# Patient Record
Sex: FEMALE | Race: WHITE | Employment: UNEMPLOYED | URBAN - METROPOLITAN AREA
[De-identification: names, ages, dates, MRNs, and addresses within clinical notes are randomized per-mention and may not be internally consistent; named-entity substitution may affect disease eponyms.]

---

## 2020-06-12 ENCOUNTER — HOSPITAL ENCOUNTER (EMERGENCY)
Age: 30
Discharge: HOME | End: 2020-06-12
Attending: EMERGENCY MEDICINE
Payer: COMMERCIAL

## 2020-06-12 VITALS
TEMPERATURE: 98.5 F | OXYGEN SATURATION: 98 % | WEIGHT: 160 LBS | DIASTOLIC BLOOD PRESSURE: 75 MMHG | HEART RATE: 95 BPM | RESPIRATION RATE: 18 BRPM | SYSTOLIC BLOOD PRESSURE: 130 MMHG | BODY MASS INDEX: 27.46 KG/M2

## 2020-06-12 DIAGNOSIS — R10.9 ABDOMINAL CRAMPS: Primary | ICD-10-CM

## 2020-06-12 LAB
APPEARANCE UR: CLEAR
BILIRUB UR QL: NEGATIVE
COLOR UR: YELLOW
GLUCOSE UR STRIP.AUTO-MCNC: NEGATIVE MG/DL
HCG UR QL: NEGATIVE
HGB UR QL STRIP: NEGATIVE
KETONES UR QL STRIP.AUTO: NEGATIVE MG/DL
LEUKOCYTE ESTERASE UR QL STRIP.AUTO: NEGATIVE
NITRITE UR QL STRIP.AUTO: NEGATIVE
PH UR STRIP: 6 [PH] (ref 5–8)
PROT UR STRIP-MCNC: NEGATIVE MG/DL
SP GR UR REFRACTOMETRY: <1.005 (ref 1–1.03)
UROBILINOGEN UR QL STRIP.AUTO: 1 EU/DL (ref 0.2–1)

## 2020-06-12 PROCEDURE — 81025 URINE PREGNANCY TEST: CPT

## 2020-06-12 PROCEDURE — 81003 URINALYSIS AUTO W/O SCOPE: CPT

## 2020-06-12 PROCEDURE — 99283 EMERGENCY DEPT VISIT LOW MDM: CPT

## 2020-06-12 NOTE — DISCHARGE INSTRUCTIONS

## 2020-06-12 NOTE — ED PROVIDER NOTES
HPI patient says she is here for a pregnancy test.  She has a history of diffuse multiple abdominal type complaints to include abdominal cramping abdominal burning and nausea \"for several years\". On initial interview she has diffuse multiple nonspecific complaints. She has no consistent specific complaint. When I asked her Bebe Worthington can I do for you today\" her response was \"I really just want a pregnancy test\". She says she is about a week late on her menstrual cycle and has not taken a home pregnancy test.    Past Medical History:   Diagnosis Date    Heart murmur     Recurrent UTI        Past Surgical History:   Procedure Laterality Date    HX WISDOM TEETH EXTRACTION           No family history on file.     Social History     Socioeconomic History    Marital status: SINGLE     Spouse name: Not on file    Number of children: Not on file    Years of education: Not on file    Highest education level: Not on file   Occupational History    Not on file   Social Needs    Financial resource strain: Not on file    Food insecurity     Worry: Not on file     Inability: Not on file    Transportation needs     Medical: Not on file     Non-medical: Not on file   Tobacco Use    Smoking status: Current Every Day Smoker     Packs/day: 1.00   Substance and Sexual Activity    Alcohol use: Yes     Comment: social    Drug use: Not on file    Sexual activity: Not on file   Lifestyle    Physical activity     Days per week: Not on file     Minutes per session: Not on file    Stress: Not on file   Relationships    Social connections     Talks on phone: Not on file     Gets together: Not on file     Attends Judaism service: Not on file     Active member of club or organization: Not on file     Attends meetings of clubs or organizations: Not on file     Relationship status: Not on file    Intimate partner violence     Fear of current or ex partner: Not on file     Emotionally abused: Not on file     Physically abused: Not on file     Forced sexual activity: Not on file   Other Topics Concern    Not on file   Social History Narrative    Not on file         ALLERGIES: Haldol [haloperidol lactate]    Review of Systems   Constitutional: Negative. HENT: Negative. Eyes: Negative. Respiratory: Negative. Cardiovascular: Negative. Gastrointestinal: Positive for abdominal pain and nausea. Genitourinary: Negative. Neurological: Negative. Psychiatric/Behavioral: Positive for dysphoric mood. The patient is nervous/anxious. Vitals:    06/12/20 1304   BP: 130/75   Pulse: 95   Resp: 18   Temp: 98.5 °F (36.9 °C)   SpO2: 98%   Weight: 72.6 kg (160 lb)            Physical Exam  Vitals signs and nursing note reviewed. Constitutional:       Appearance: She is well-developed. HENT:      Head: Normocephalic and atraumatic. Eyes:      Conjunctiva/sclera: Conjunctivae normal.      Pupils: Pupils are equal, round, and reactive to light. Neck:      Musculoskeletal: Normal range of motion and neck supple. Cardiovascular:      Rate and Rhythm: Normal rate and regular rhythm. Pulmonary:      Effort: Pulmonary effort is normal.      Breath sounds: Normal breath sounds. Abdominal:      Palpations: Abdomen is soft. Musculoskeletal: Normal range of motion. Skin:     General: Skin is warm and dry. Neurological:      Mental Status: She is alert and oriented to person, place, and time. MDM         Procedures               I reviewed the results of the ER evaluation with the patient and she understands and agrees with the disposition and follow-up plan.   Gay El MD 2:17 PM

## 2020-07-29 ENCOUNTER — HOSPITAL ENCOUNTER (EMERGENCY)
Age: 30
Discharge: HOME | End: 2020-07-30
Attending: EMERGENCY MEDICINE | Admitting: EMERGENCY MEDICINE
Payer: COMMERCIAL

## 2020-07-29 VITALS
OXYGEN SATURATION: 96 % | TEMPERATURE: 98.7 F | SYSTOLIC BLOOD PRESSURE: 116 MMHG | RESPIRATION RATE: 18 BRPM | HEART RATE: 100 BPM | DIASTOLIC BLOOD PRESSURE: 76 MMHG

## 2020-07-29 DIAGNOSIS — F41.9 ANXIETY: ICD-10-CM
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
## 2020-07-29 LAB
ALBUMIN SERPL-MCNC: 4.7 G/DL (ref 3.4–5)
ALBUMIN/GLOB SERPL: 1.3 {RATIO} (ref 0.8–1.7)
ALP SERPL-CCNC: 83 U/L (ref 45–117)
ALT SERPL-CCNC: 22 U/L (ref 13–56)
AMPHET UR QL SCN: NEGATIVE
ANION GAP SERPL CALC-SCNC: 7 MMOL/L (ref 3–18)
AST SERPL-CCNC: 14 U/L (ref 10–38)
BARBITURATES UR QL SCN: NEGATIVE
BASOPHILS # BLD: 0 K/UL (ref 0–0.1)
BASOPHILS NFR BLD: 0 % (ref 0–2)
BENZODIAZ UR QL: NEGATIVE
BILIRUB SERPL-MCNC: 0.2 MG/DL (ref 0.2–1)
BUN SERPL-MCNC: 11 MG/DL (ref 7–18)
BUN/CREAT SERPL: 13 (ref 12–20)
CALCIUM SERPL-MCNC: 9.6 MG/DL (ref 8.5–10.1)
CANNABINOIDS UR QL SCN: NEGATIVE
CHLORIDE SERPL-SCNC: 109 MMOL/L (ref 100–111)
CO2 SERPL-SCNC: 25 MMOL/L (ref 21–32)
COCAINE UR QL SCN: NEGATIVE
COVID-19 RAPID TEST, COVR: NOT DETECTED
CREAT SERPL-MCNC: 0.84 MG/DL (ref 0.6–1.3)
DIFFERENTIAL METHOD BLD: ABNORMAL
EOSINOPHIL # BLD: 0.1 K/UL (ref 0–0.4)
EOSINOPHIL NFR BLD: 1 % (ref 0–5)
ERYTHROCYTE [DISTWIDTH] IN BLOOD BY AUTOMATED COUNT: 13.8 % (ref 11.6–14.5)
ETHANOL SERPL-MCNC: <3 MG/DL (ref 0–3)
GLOBULIN SER CALC-MCNC: 3.6 G/DL (ref 2–4)
GLUCOSE SERPL-MCNC: 101 MG/DL (ref 74–99)
HCG UR QL: NEGATIVE
HCT VFR BLD AUTO: 41.5 % (ref 35–45)
HDSCOM,HDSCOM: NORMAL
HGB BLD-MCNC: 13.9 G/DL (ref 12–16)
LYMPHOCYTES # BLD: 2.3 K/UL (ref 0.9–3.6)
LYMPHOCYTES NFR BLD: 20 % (ref 21–52)
MCH RBC QN AUTO: 28.5 PG (ref 24–34)
MCHC RBC AUTO-ENTMCNC: 33.5 G/DL (ref 31–37)
MCV RBC AUTO: 85 FL (ref 74–97)
METHADONE UR QL: NEGATIVE
MONOCYTES # BLD: 0.7 K/UL (ref 0.05–1.2)
MONOCYTES NFR BLD: 6 % (ref 3–10)
NEUTS SEG # BLD: 8.3 K/UL (ref 1.8–8)
NEUTS SEG NFR BLD: 73 % (ref 40–73)
OPIATES UR QL: NEGATIVE
PCP UR QL: NEGATIVE
PLATELET # BLD AUTO: 409 K/UL (ref 135–420)
PMV BLD AUTO: 9.5 FL (ref 9.2–11.8)
POTASSIUM SERPL-SCNC: 3.9 MMOL/L (ref 3.5–5.5)
PROT SERPL-MCNC: 8.3 G/DL (ref 6.4–8.2)
RBC # BLD AUTO: 4.88 M/UL (ref 4.2–5.3)
SODIUM SERPL-SCNC: 141 MMOL/L (ref 136–145)
SOURCE, COVRS: NORMAL
WBC # BLD AUTO: 11.4 K/UL (ref 4.6–13.2)

## 2020-07-29 PROCEDURE — 85025 COMPLETE CBC W/AUTO DIFF WBC: CPT

## 2020-07-29 PROCEDURE — 87635 SARS-COV-2 COVID-19 AMP PRB: CPT

## 2020-07-29 PROCEDURE — 80053 COMPREHEN METABOLIC PANEL: CPT

## 2020-07-29 PROCEDURE — 80307 DRUG TEST PRSMV CHEM ANLYZR: CPT

## 2020-07-29 PROCEDURE — 99284 EMERGENCY DEPT VISIT MOD MDM: CPT

## 2020-07-29 PROCEDURE — 81025 URINE PREGNANCY TEST: CPT
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
## 2020-07-30 PROCEDURE — 74011250637 HC RX REV CODE- 250/637: Performed by: EMERGENCY MEDICINE

## 2020-07-30 RX ORDER — LORAZEPAM 1 MG/1
1 TABLET ORAL
Status: COMPLETED | OUTPATIENT
Start: 2020-07-30 | End: 2020-07-30

## 2020-07-30 RX ADMIN — LORAZEPAM 1 MG: 1 TABLET ORAL at 06:08
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
## 2020-07-30 NOTE — DISCHARGE INSTRUCTIONS

## 2020-07-30 NOTE — ED NOTES
[facility]  Emergency Department Treatment Report      Progress note    Assumed care of patient at 1800 from [place]. Patient is awaiting labs for further medical clearance. Patient is awake alert and cooperative at this time. Labs are back UA is complete crisis is informed of medical clearance Kevin Finch states she will see patient by tele-med. Pt will be sent to shelter in the morning, she will stay in ed until then, charge nurse informed    Attending md informed of pt status to stay in ed till the morning.           Lamar Melendez ENP-C, FNP-C

## 2020-08-10 ENCOUNTER — HOSPITAL ENCOUNTER (EMERGENCY)
Age: 30
Discharge: HOME | End: 2020-08-10
Attending: EMERGENCY MEDICINE
Payer: COMMERCIAL

## 2020-08-10 VITALS
OXYGEN SATURATION: 98 % | TEMPERATURE: 98.7 F | DIASTOLIC BLOOD PRESSURE: 75 MMHG | RESPIRATION RATE: 18 BRPM | HEART RATE: 91 BPM | SYSTOLIC BLOOD PRESSURE: 121 MMHG

## 2020-08-10 DIAGNOSIS — K02.9 PAIN DUE TO DENTAL CARIES: Primary | ICD-10-CM

## 2020-08-10 PROCEDURE — 99282 EMERGENCY DEPT VISIT SF MDM: CPT

## 2020-08-10 RX ORDER — CLONIDINE HYDROCHLORIDE 0.1 MG/1
0.1 TABLET ORAL 2 TIMES DAILY
COMMUNITY

## 2020-08-10 RX ORDER — RISPERIDONE 1 MG/1
1 TABLET, FILM COATED ORAL DAILY
COMMUNITY

## 2020-08-10 RX ORDER — BUSPIRONE HYDROCHLORIDE 15 MG/1
15 TABLET ORAL 3 TIMES DAILY
COMMUNITY

## 2020-08-10 RX ORDER — IBUPROFEN 600 MG/1
600 TABLET ORAL
Qty: 20 TAB | Refills: 0 | Status: SHIPPED | OUTPATIENT
Start: 2020-08-10

## 2020-08-10 RX ORDER — OLANZAPINE 10 MG/1
10 TABLET ORAL
COMMUNITY

## 2020-08-10 NOTE — ED PROVIDER NOTES
EMERGENCY DEPARTMENT HISTORY AND PHYSICAL EXAM    2:16 AM  Date: 8/10/2020  Patient Name: Juan Manuel Ochoa    History of Presenting Illness     Chief Complaint   Patient presents with    Dental Pain        History Provided By: Patient    HPI: Juan Manuel Ochoa is a 30 y.o. female with history of psychiatric disorders. Patient is presenting with tooth pain. She she broke it months ago trying to chew a fannie and yesterday she was chewing some food and had a crack again and since then has been very painful. No history of nausea or vomiting or fever. Patient staying at the shelter and is asking for resources for free clinic    Location:  Severity:  Timing/course: Onset/Duration:     PCP: Irene Donahue MD    Past History     Past Medical History:  Past Medical History:   Diagnosis Date    Heart murmur     Recurrent UTI        Past Surgical History:  Past Surgical History:   Procedure Laterality Date    HX WISDOM TEETH EXTRACTION         Family History:  History reviewed. No pertinent family history. Social History:  Social History     Tobacco Use    Smoking status: Current Every Day Smoker     Packs/day: 1.00    Smokeless tobacco: Never Used   Substance Use Topics    Alcohol use: Yes     Comment: social    Drug use: Not on file       Allergies: Allergies   Allergen Reactions    Haldol [Haloperidol Lactate] Unknown (comments)       Review of Systems   Review of Systems   HENT: Positive for dental problem. All other systems reviewed and are negative. Physical Exam     Patient Vitals for the past 12 hrs:   Temp Pulse Resp BP SpO2   08/10/20 0046 98.7 °F (37.1 °C) 91 18 121/75 98 %       Physical Exam  Vitals signs and nursing note reviewed. Constitutional:       Appearance: Normal appearance. HENT:      Head: Normocephalic and atraumatic. Mouth/Throat:     Eyes:      Extraocular Movements: Extraocular movements intact.    Neck:      Musculoskeletal: Normal range of motion and neck supple. Cardiovascular:      Rate and Rhythm: Normal rate. Pulmonary:      Effort: Pulmonary effort is normal. No respiratory distress. Musculoskeletal: Normal range of motion. General: No deformity. Skin:     General: Skin is warm and dry. Neurological:      General: No focal deficit present. Mental Status: She is alert and oriented to person, place, and time. Psychiatric:         Mood and Affect: Mood normal.         Behavior: Behavior normal.         Diagnostic Study Results     Labs -  No results found for this or any previous visit (from the past 12 hour(s)). Radiologic Studies -   No results found. Medical Decision Making     ED Course: Progress Notes, Reevaluation, and Consults:    2:16 AM Initial assessment performed. The patients presenting problems have been discussed, and they/their family are in agreement with the care plan formulated and outlined with them. I have encouraged them to ask questions as they arise throughout their visit. Provider Notes (Medical Decision Making): 24-year-old female presenting with dental pain. No fluctuance or infection appreciated on exam.  No red flags. We will give her a prescription for ibuprofen and dental clinic follow-up. Procedures:     Critical Care Time:     Vital Signs-Reviewed the patient's vital signs. Reviewed pt's pulse ox reading. EKG: Interpreted by the EP. Time Interpreted:    Rate:    Rhythm:    Interpretation:   Comparison:     Records Reviewed: Nursing Notes (Time of Review: 2:16 AM)  -I am the first provider for this patient.  -I reviewed the vital signs, available nursing notes, past medical history, past surgical history, family history and social history. Current Outpatient Medications   Medication Sig Dispense Refill    risperiDONE (RisperDAL) 1 mg tablet Take 1 mg by mouth daily.  cloNIDine HCL (CATAPRES) 0.1 mg tablet Take 0.1 mg by mouth two (2) times a day.       busPIRone (BUSPAR) 15 mg tablet Take 15 mg by mouth three (3) times daily.  OLANZapine (ZyPREXA) 10 mg tablet Take 10 mg by mouth nightly.  naproxen (NAPROSYN) 500 mg tablet Take 1 Tab by mouth two (2) times daily (with meals). 20 Tab 0        Clinical Impression     Clinical Impression: No diagnosis found. Disposition: dc      This note was dictated utilizing voice recognition software which may lead to typographical errors. I apologize in advance if the situation occurs. If questions arise please do not hesitate to contact me or call our department.     Liseth Mabry MD  2:16 AM

## 2020-08-10 NOTE — DISCHARGE INSTRUCTIONS
Patient Education        Tooth Decay: Care Instructions  Your Care Instructions    Tooth decay is damage to a tooth caused by plaque. Plaque is a thin film of bacteria that sticks to the teeth above and below the gum line. If plaque isn't removed from the teeth, it can build up and harden into tartar. The bacteria in plaque and tartar use sugars in food to make acids. These acids can cause tooth decay and gum disease. Any part of your tooth can decay, from the roots below the gum line to the chewing surface. Decay can affect the outer layer (enamel) or inner layer (dentin) of your teeth. The deeper the decay, the worse the damage. Untreated tooth decay will get worse and may lead to tooth loss. If you have a small hole (cavity) in your tooth, your dentist can repair it by removing the decay and filling the hole. If you have deeper decay, you may need more treatment. A very badly damaged tooth may have to be removed. Follow-up care is a key part of your treatment and safety. Be sure to make and go to all appointments, and call your dentist if you are having problems. It's also a good idea to know your test results and keep a list of the medicines you take. How can you care for yourself at home? If you have pain:  · Take an over-the-counter pain medicine, such as acetaminophen (Tylenol), ibuprofen (Advil, Motrin), or naproxen (Aleve). Be safe with medicines. Read and follow all instructions on the label. ? Do not take two or more pain medicines at the same time unless the doctor told you to. Many pain medicines have acetaminophen, which is Tylenol. Too much acetaminophen (Tylenol) can be harmful. · Put ice or a cold pack on your cheek over the tooth for 10 to 15 minutes at a time. Put a thin cloth between the ice and your skin. To prevent tooth decay  · Brush teeth twice a day, and floss once a day. Brushing with fluoride toothpaste and flossing may be enough to reverse early decay.   · Use a toothbrush with soft, rounded-end bristles and a head that is small enough to reach all parts of your teeth and mouth. Replace your toothbrush every 3 or 4 months. You may also use an electric toothbrush that has rotating and oscillating (back-and-forth) action. · Ask your dentist about having fluoride treatments at the dental office. · Brush your tongue to help get rid of bacteria. · Eat healthy foods that include whole grains, vegetables, and fruits. · Have your teeth cleaned by a professional at least two times a year. · Do not smoke or use smokeless tobacco. Tobacco can make tooth decay worse. When should you call for help? [id number] anytime you think you may need emergency care. For example, call if:  · You have trouble breathing. Call your dentist now or seek immediate medical care if:  · You have new or worse symptoms of infection, such as:  ? Increased pain, swelling, warmth, or redness. ? Red streaks leading from the area. ? Pus draining from the area. ? A fever. Watch closely for changes in your health, and be sure to contact your doctor if:  · You do not get better as expected. Where can you learn more? Go to http://ryley-andrea.info/  Enter F057 in the search box to learn more about \"Tooth Decay: Care Instructions. \"  Current as of: March 25, 2020               Content Version: 12.5  © 7047-3712 [employer]. Care instructions adapted under license by [employer] (which disclaims liability or warranty for this information). If you have questions about a medical condition or this instruction, always ask your healthcare professional. [place] [address] any warranty or liability for your use of this information.

## 2020-08-10 NOTE — ED TRIAGE NOTES
Pt reports having chronic and worsening right lower/upper jaw pain with dental decay. Pt states she cracked her back lower tooth chewing on a fannie several months ago. Today pt broke off another piece while eating. Pt reports severe pain and tenderness.  Pt states she has a  who cannot help her with a dentist.

## 2021-02-27 ENCOUNTER — HOSPITAL ENCOUNTER (EMERGENCY)
Age: 31
Discharge: HOME | End: 2021-02-27
Attending: EMERGENCY MEDICINE
Payer: COMMERCIAL

## 2021-02-27 VITALS
HEIGHT: 66 IN | SYSTOLIC BLOOD PRESSURE: 146 MMHG | RESPIRATION RATE: 14 BRPM | OXYGEN SATURATION: 99 % | DIASTOLIC BLOOD PRESSURE: 81 MMHG | WEIGHT: 160.05 LBS | BODY MASS INDEX: 25.72 KG/M2 | HEART RATE: 95 BPM

## 2021-02-27 DIAGNOSIS — R45.1 AGITATION: Primary | ICD-10-CM

## 2021-02-27 DIAGNOSIS — F30.9 MANIC EPISODE (HCC): ICD-10-CM

## 2021-02-27 PROCEDURE — 99284 EMERGENCY DEPT VISIT MOD MDM: CPT

## 2021-02-27 RX ORDER — BUSPIRONE HYDROCHLORIDE 7.5 MG/1
7.5 TABLET ORAL 2 TIMES DAILY
Qty: 30 TAB | Refills: 0 | Status: SHIPPED | OUTPATIENT
Start: 2021-02-27 | End: 2021-03-14

## 2021-02-27 RX ORDER — BUSPIRONE HYDROCHLORIDE 5 MG/1
7.5 TABLET ORAL
Status: DISCONTINUED | OUTPATIENT
Start: 2021-02-27 | End: 2021-02-27 | Stop reason: HOSPADM

## 2021-02-27 NOTE — ED NOTES
Was asked by nurse to go into the room as the patient wanted to talk to me. When I got into the room the patient was talking on the phone. I asked the patient to please get off the phone while I talked to her. Explained that I could not be sure if I was being recorded and didn't feel comfortable talking to her in this manner. Patient states it is her best friend in the world recording the conversation for her, at which point I told the patient I would not talk to her. She thanked me for my time. I got out of the room and immediately called nurse supervisor Yong Serrato to make her aware of the situation.

## 2021-02-27 NOTE — ED PROVIDER NOTES
HPI   Patient with past medical history of bipolar disease, schizoaffective disease presents with a manic episode from her hotel at the recommendation of the . Patient is on buspirone but has not taken it for a while. Patient denies any homicidal or suicidal ideation. Social history smoker  Patient denies any drug use      Past Medical History:   Diagnosis Date    Heart murmur     Recurrent UTI        Past Surgical History:   Procedure Laterality Date    HX WISDOM TEETH EXTRACTION           No family history on file.     Social History     Socioeconomic History    Marital status: SINGLE     Spouse name: Not on file    Number of children: Not on file    Years of education: Not on file    Highest education level: Not on file   Occupational History    Not on file   Social Needs    Financial resource strain: Not on file    Food insecurity     Worry: Not on file     Inability: Not on file    Transportation needs     Medical: Not on file     Non-medical: Not on file   Tobacco Use    Smoking status: Current Every Day Smoker     Packs/day: 1.00    Smokeless tobacco: Never Used   Substance and Sexual Activity    Alcohol use: Yes     Comment: social    Drug use: Not on file    Sexual activity: Not on file   Lifestyle    Physical activity     Days per week: Not on file     Minutes per session: Not on file    Stress: Not on file   Relationships    Social connections     Talks on phone: Not on file     Gets together: Not on file     Attends [employer] service: Not on file     Active member of club or organization: Not on file     Attends meetings of clubs or organizations: Not on file     Relationship status: Not on file    Intimate partner violence     Fear of current or ex partner: Not on file     Emotionally abused: Not on file     Physically abused: Not on file     Forced sexual activity: Not on file   Other Topics Concern    Not on file   Social History Narrative    Not on file ALLERGIES: Haldol [haloperidol lactate]    Review of Systems   Constitutional: Negative for activity change, appetite change and chills. HENT: Negative for congestion, ear discharge, ear pain and sore throat. Eyes: Negative for photophobia and pain. Respiratory: Negative for cough and choking. Cardiovascular: Negative for palpitations and leg swelling. Gastrointestinal: Negative for anal bleeding and rectal pain. Endocrine: Negative for polydipsia and polyuria. Genitourinary: Negative for genital sores and urgency. Musculoskeletal: Negative for arthralgias and myalgias. Neurological: Negative for dizziness, seizures and speech difficulty. Psychiatric/Behavioral: Positive for agitation. Negative for hallucinations, self-injury and suicidal ideas. The patient is nervous/anxious and is hyperactive. There were no vitals filed for this visit. Physical Exam  HENT:      Head: Normocephalic and atraumatic. Eyes:      Pupils: Pupils are equal, round, and reactive to light. Neck:      Musculoskeletal: Normal range of motion and neck supple. Cardiovascular:      Rate and Rhythm: Normal rate. Heart sounds: No friction rub. No gallop. Pulmonary:      Effort: No respiratory distress. Abdominal:      General: Bowel sounds are normal.      Palpations: Abdomen is soft. Musculoskeletal: Normal range of motion. Skin:     General: Skin is warm and dry. Neurological:      Mental Status: She is alert and oriented to person, place, and time. Cranial Nerves: No cranial nerve deficit. Psychiatric:         Mood and Affect: Mood is anxious. Behavior: Behavior is agitated. Thought Content:  Thought content normal.         Judgment: Judgment normal.      Comments: Flight of ideas  Pressured speech          MDM   Patient presents with what appears a manic episode  Patient not suicidal homicidal  Initial plan was to perform labs and medically clear the patient  And then for patient to undergo crisis evaluation  Patient refuses lab tests  Patient was upset with her interaction with nursing  Offered to her to speak to the charge nurse  Charge nurse had discussed with her  Patient refuses labs and she wants to be seen in a different facility  I explained to patient that she needs psychiatric evaluation and that we need to perform labs before crisis team can see here  Patient adamantly refuses.   Patient has capacity so I do not see a reason to hold patient against her will  Patient states that she immediately will go to be seen in a different facility in [address] her to return if she changes her mind   Patient not suicidal homicidal at present      Procedures

## 2022-12-09 ENCOUNTER — TELEPHONE (OUTPATIENT)
Dept: FAMILY MEDICINE CLINIC | Age: 32
End: 2022-12-09

## 2023-08-08 ENCOUNTER — HOSPITAL ENCOUNTER (OUTPATIENT)
Facility: HOSPITAL | Age: 33
Setting detail: SPECIMEN
Discharge: HOME | End: 2023-08-11

## 2023-08-08 LAB — LITHIUM SERPL-SCNC: 0.6 MMOL/L (ref 0.6–1.2)

## 2023-08-08 PROCEDURE — 80178 ASSAY OF LITHIUM: CPT

## 2023-09-21 ENCOUNTER — HOSPITAL ENCOUNTER (OUTPATIENT)
Facility: HOSPITAL | Age: 33
Setting detail: SPECIMEN
Discharge: HOME | End: 2023-09-24

## 2023-09-21 LAB — LITHIUM SERPL-SCNC: <0.2 MMOL/L (ref 0.6–1.2)

## 2023-09-21 PROCEDURE — 80178 ASSAY OF LITHIUM: CPT

## 2023-09-29 ENCOUNTER — HOSPITAL ENCOUNTER (OUTPATIENT)
Facility: HOSPITAL | Age: 33
Setting detail: SPECIMEN
Discharge: HOME | End: 2023-10-02

## 2023-09-29 LAB — LITHIUM SERPL-SCNC: 1 MMOL/L (ref 0.6–1.2)

## 2023-09-29 PROCEDURE — 80178 ASSAY OF LITHIUM: CPT

## 2024-07-16 ENCOUNTER — HOSPITAL ENCOUNTER (EMERGENCY)
Facility: HOSPITAL | Age: 34
Discharge: PSYCHIATRIC OR SUBSTANCE USE FACILITY | End: 2024-07-22
Attending: STUDENT IN AN ORGANIZED HEALTH CARE EDUCATION/TRAINING PROGRAM

## 2024-07-16 DIAGNOSIS — F19.10 POLYSUBSTANCE ABUSE (HCC): ICD-10-CM

## 2024-07-16 DIAGNOSIS — F23 ACUTE PSYCHOSIS (HCC): Primary | ICD-10-CM

## 2024-07-16 LAB
ALBUMIN SERPL-MCNC: 4.3 G/DL (ref 3.4–5)
ALBUMIN/GLOB SERPL: 1 (ref 0.8–1.7)
ALP SERPL-CCNC: 111 U/L (ref 45–117)
ALT SERPL-CCNC: 31 U/L (ref 13–56)
AMPHET UR QL SCN: POSITIVE
ANION GAP SERPL CALC-SCNC: 9 MMOL/L (ref 3–18)
APAP SERPL-MCNC: <2 UG/ML (ref 10–30)
APPEARANCE UR: CLEAR
AST SERPL-CCNC: 38 U/L (ref 10–38)
BARBITURATES UR QL SCN: NEGATIVE
BASOPHILS # BLD: 0.1 K/UL (ref 0–0.1)
BASOPHILS NFR BLD: 1 % (ref 0–2)
BENZODIAZ UR QL: NEGATIVE
BILIRUB SERPL-MCNC: 0.3 MG/DL (ref 0.2–1)
BILIRUB UR QL: NEGATIVE
BUN SERPL-MCNC: 20 MG/DL (ref 7–18)
BUN/CREAT SERPL: 25 (ref 12–20)
CALCIUM SERPL-MCNC: 10.3 MG/DL (ref 8.5–10.1)
CANNABINOIDS UR QL SCN: POSITIVE
CHLORIDE SERPL-SCNC: 106 MMOL/L (ref 100–111)
CO2 SERPL-SCNC: 20 MMOL/L (ref 21–32)
COCAINE UR QL SCN: NEGATIVE
COLOR UR: YELLOW
CREAT SERPL-MCNC: 0.8 MG/DL (ref 0.6–1.3)
DIFFERENTIAL METHOD BLD: ABNORMAL
EOSINOPHIL # BLD: 0.1 K/UL (ref 0–0.4)
EOSINOPHIL NFR BLD: 1 % (ref 0–5)
ERYTHROCYTE [DISTWIDTH] IN BLOOD BY AUTOMATED COUNT: 14 % (ref 11.6–14.5)
ETHANOL SERPL-MCNC: <3 MG/DL (ref 0–3)
GLOBULIN SER CALC-MCNC: 4.2 G/DL (ref 2–4)
GLUCOSE SERPL-MCNC: 93 MG/DL (ref 74–99)
GLUCOSE UR STRIP.AUTO-MCNC: NEGATIVE MG/DL
HCG SERPL QL: NEGATIVE
HCT VFR BLD AUTO: 40 % (ref 35–45)
HGB BLD-MCNC: 13.1 G/DL (ref 12–16)
HGB UR QL STRIP: NEGATIVE
IMM GRANULOCYTES # BLD AUTO: 0.1 K/UL (ref 0–0.04)
IMM GRANULOCYTES NFR BLD AUTO: 1 % (ref 0–0.5)
KETONES UR QL STRIP.AUTO: 40 MG/DL
LEUKOCYTE ESTERASE UR QL STRIP.AUTO: NEGATIVE
LYMPHOCYTES # BLD: 2.3 K/UL (ref 0.9–3.6)
LYMPHOCYTES NFR BLD: 15 % (ref 21–52)
Lab: ABNORMAL
MAGNESIUM SERPL-MCNC: 2.1 MG/DL (ref 1.6–2.6)
MCH RBC QN AUTO: 27.3 PG (ref 24–34)
MCHC RBC AUTO-ENTMCNC: 32.8 G/DL (ref 31–37)
MCV RBC AUTO: 83.5 FL (ref 78–100)
METHADONE UR QL: NEGATIVE
MONOCYTES # BLD: 1.5 K/UL (ref 0.05–1.2)
MONOCYTES NFR BLD: 10 % (ref 3–10)
NEUTS SEG # BLD: 11.2 K/UL (ref 1.8–8)
NEUTS SEG NFR BLD: 73 % (ref 40–73)
NITRITE UR QL STRIP.AUTO: NEGATIVE
NRBC # BLD: 0 K/UL (ref 0–0.01)
NRBC BLD-RTO: 0 PER 100 WBC
OPIATES UR QL: NEGATIVE
PCP UR QL: NEGATIVE
PH UR STRIP: 5.5 (ref 5–8)
PLATELET # BLD AUTO: 393 K/UL (ref 135–420)
PMV BLD AUTO: 9.9 FL (ref 9.2–11.8)
POTASSIUM SERPL-SCNC: 3.5 MMOL/L (ref 3.5–5.5)
PROT SERPL-MCNC: 8.5 G/DL (ref 6.4–8.2)
PROT UR STRIP-MCNC: NEGATIVE MG/DL
RBC # BLD AUTO: 4.79 M/UL (ref 4.2–5.3)
SALICYLATES SERPL-MCNC: 4.3 MG/DL (ref 2.8–20)
SODIUM SERPL-SCNC: 135 MMOL/L (ref 136–145)
SP GR UR REFRACTOMETRY: >1.03 (ref 1–1.03)
UROBILINOGEN UR QL STRIP.AUTO: 1 EU/DL (ref 0.2–1)
WBC # BLD AUTO: 15.3 K/UL (ref 4.6–13.2)

## 2024-07-16 PROCEDURE — 80179 DRUG ASSAY SALICYLATE: CPT

## 2024-07-16 PROCEDURE — 80053 COMPREHEN METABOLIC PANEL: CPT

## 2024-07-16 PROCEDURE — 6360000002 HC RX W HCPCS

## 2024-07-16 PROCEDURE — 83735 ASSAY OF MAGNESIUM: CPT

## 2024-07-16 PROCEDURE — 80143 DRUG ASSAY ACETAMINOPHEN: CPT

## 2024-07-16 PROCEDURE — 84703 CHORIONIC GONADOTROPIN ASSAY: CPT

## 2024-07-16 PROCEDURE — 80307 DRUG TEST PRSMV CHEM ANLYZR: CPT

## 2024-07-16 PROCEDURE — 85025 COMPLETE CBC W/AUTO DIFF WBC: CPT

## 2024-07-16 PROCEDURE — 2580000003 HC RX 258

## 2024-07-16 PROCEDURE — 82077 ASSAY SPEC XCP UR&BREATH IA: CPT

## 2024-07-16 PROCEDURE — 96361 HYDRATE IV INFUSION ADD-ON: CPT

## 2024-07-16 PROCEDURE — 81003 URINALYSIS AUTO W/O SCOPE: CPT

## 2024-07-16 PROCEDURE — 99285 EMERGENCY DEPT VISIT HI MDM: CPT

## 2024-07-16 PROCEDURE — 96372 THER/PROPH/DIAG INJ SC/IM: CPT

## 2024-07-16 RX ORDER — MIDAZOLAM HYDROCHLORIDE 1 MG/ML
5 INJECTION INTRAMUSCULAR; INTRAVENOUS
Status: COMPLETED | OUTPATIENT
Start: 2024-07-16 | End: 2024-07-16

## 2024-07-16 RX ORDER — 0.9 % SODIUM CHLORIDE 0.9 %
1000 INTRAVENOUS SOLUTION INTRAVENOUS ONCE
Status: COMPLETED | OUTPATIENT
Start: 2024-07-16 | End: 2024-07-16

## 2024-07-16 RX ADMIN — ZIPRASIDONE MESYLATE 20 MG: 20 INJECTION, POWDER, LYOPHILIZED, FOR SOLUTION INTRAMUSCULAR at 14:43

## 2024-07-16 RX ADMIN — SODIUM CHLORIDE 1000 ML: 9 INJECTION, SOLUTION INTRAVENOUS at 18:19

## 2024-07-16 RX ADMIN — MIDAZOLAM 5 MG: 1 INJECTION INTRAMUSCULAR; INTRAVENOUS at 16:36

## 2024-07-16 NOTE — BSMART NOTE
Crisis note:    [employer] acknowledged. Will follow up with patient once all labs are resulted and patient is medically clear.

## 2024-07-16 NOTE — BSMART NOTE
Crisis note:    Called the [facility] answering to request the on call clinician be paged to request a TDO evaluation on this patient.    1635: Sarah of [facility] responded to page. Has been notified of TDO screening request. Patient becoming sedated due to Geodon given earlier. Sarah has asked to be called once patient can participate in the evaluation.

## 2024-07-16 NOTE — ED TRIAGE NOTES
Pt in ED via EMS for erratic behavior. Pt was found walking down street by a . Pt was behaving erratically, has no shoes, disheveled, covered in dirt, and carrying a blanket and stuffed animal. Pt does not c/o SOB, CP, nausea, or vomiting, and does not appear to be in any physical distress. Pt is screaming, hitting bed, then begins whispering, and speaking incoherently at times.

## 2024-07-16 NOTE — BSMART NOTE
Crisis note:    Patient in room 18 yelling loudly, screaming at times. Very restless and manic. Carrying a stuffed animal. Began to speak in a childlike voice. When asked why she was yelling and screaming she stated \"because I'm scared\" but did not disclose what she was scared of. Patient at this time does not appear to have the capacity to consent for voluntary psychiatric treatment. Will petition for a TDO and contact [facility].

## 2024-07-17 PROCEDURE — 2580000003 HC RX 258: Performed by: EMERGENCY MEDICINE

## 2024-07-17 PROCEDURE — 6360000002 HC RX W HCPCS: Performed by: EMERGENCY MEDICINE

## 2024-07-17 PROCEDURE — 96374 THER/PROPH/DIAG INJ IV PUSH: CPT

## 2024-07-17 PROCEDURE — 6370000000 HC RX 637 (ALT 250 FOR IP): Performed by: STUDENT IN AN ORGANIZED HEALTH CARE EDUCATION/TRAINING PROGRAM

## 2024-07-17 PROCEDURE — 96372 THER/PROPH/DIAG INJ SC/IM: CPT

## 2024-07-17 PROCEDURE — 2500000003 HC RX 250 WO HCPCS

## 2024-07-17 PROCEDURE — 94761 N-INVAS EAR/PLS OXIMETRY MLT: CPT

## 2024-07-17 PROCEDURE — 96375 TX/PRO/DX INJ NEW DRUG ADDON: CPT

## 2024-07-17 RX ORDER — MIDAZOLAM HYDROCHLORIDE 1 MG/ML
5 INJECTION INTRAMUSCULAR; INTRAVENOUS ONCE
Status: COMPLETED | OUTPATIENT
Start: 2024-07-17 | End: 2024-07-17

## 2024-07-17 RX ORDER — LORAZEPAM 1 MG/1
1 TABLET ORAL ONCE
Status: COMPLETED | OUTPATIENT
Start: 2024-07-17 | End: 2024-07-17

## 2024-07-17 RX ORDER — LORAZEPAM 2 MG/ML
2 INJECTION INTRAMUSCULAR ONCE
Status: DISCONTINUED | OUTPATIENT
Start: 2024-07-17 | End: 2024-07-19

## 2024-07-17 RX ORDER — HYDROXYZINE HYDROCHLORIDE 25 MG/1
25 TABLET, FILM COATED ORAL
Status: COMPLETED | OUTPATIENT
Start: 2024-07-17 | End: 2024-07-17

## 2024-07-17 RX ORDER — LORAZEPAM 1 MG/1
1 TABLET ORAL ONCE
Status: DISCONTINUED | OUTPATIENT
Start: 2024-07-17 | End: 2024-07-17

## 2024-07-17 RX ORDER — DIPHENHYDRAMINE HYDROCHLORIDE 50 MG/ML
50 INJECTION INTRAMUSCULAR; INTRAVENOUS
Status: COMPLETED | OUTPATIENT
Start: 2024-07-17 | End: 2024-07-17

## 2024-07-17 RX ORDER — KETAMINE HYDROCHLORIDE 50 MG/ML
3 INJECTION, SOLUTION INTRAMUSCULAR; INTRAVENOUS
Status: DISCONTINUED | OUTPATIENT
Start: 2024-07-17 | End: 2024-07-17

## 2024-07-17 RX ORDER — DIPHENHYDRAMINE HYDROCHLORIDE 50 MG/ML
50 INJECTION INTRAMUSCULAR; INTRAVENOUS ONCE
Status: COMPLETED | OUTPATIENT
Start: 2024-07-17 | End: 2024-07-17

## 2024-07-17 RX ORDER — KETAMINE HYDROCHLORIDE 50 MG/ML
2 INJECTION, SOLUTION INTRAMUSCULAR; INTRAVENOUS
Status: COMPLETED | OUTPATIENT
Start: 2024-07-17 | End: 2024-07-17

## 2024-07-17 RX ORDER — HALOPERIDOL 5 MG/ML
10 INJECTION INTRAMUSCULAR
Status: COMPLETED | OUTPATIENT
Start: 2024-07-17 | End: 2024-07-17

## 2024-07-17 RX ORDER — LORAZEPAM 2 MG/ML
2 INJECTION INTRAMUSCULAR ONCE
Status: COMPLETED | OUTPATIENT
Start: 2024-07-17 | End: 2024-07-17

## 2024-07-17 RX ORDER — OLANZAPINE 5 MG/1
10 TABLET, ORALLY DISINTEGRATING ORAL ONCE
Status: COMPLETED | OUTPATIENT
Start: 2024-07-17 | End: 2024-07-17

## 2024-07-17 RX ADMIN — KETAMINE HYDROCHLORIDE 120 MG: 50 INJECTION INTRAMUSCULAR; INTRAVENOUS at 14:36

## 2024-07-17 RX ADMIN — LORAZEPAM 1 MG: 1 TABLET ORAL at 05:44

## 2024-07-17 RX ADMIN — DIPHENHYDRAMINE HYDROCHLORIDE 50 MG: 50 INJECTION, SOLUTION INTRAMUSCULAR; INTRAVENOUS at 14:33

## 2024-07-17 RX ADMIN — ZIPRASIDONE MESYLATE 20 MG: 20 INJECTION, POWDER, LYOPHILIZED, FOR SOLUTION INTRAMUSCULAR at 11:20

## 2024-07-17 RX ADMIN — MIDAZOLAM 5 MG: 1 INJECTION INTRAMUSCULAR; INTRAVENOUS at 06:41

## 2024-07-17 RX ADMIN — OLANZAPINE 10 MG: 5 TABLET, ORALLY DISINTEGRATING ORAL at 05:54

## 2024-07-17 RX ADMIN — LORAZEPAM 2 MG: 2 INJECTION INTRAMUSCULAR; INTRAVENOUS at 21:52

## 2024-07-17 RX ADMIN — DIPHENHYDRAMINE HYDROCHLORIDE 50 MG: 50 INJECTION, SOLUTION INTRAMUSCULAR; INTRAVENOUS at 21:52

## 2024-07-17 RX ADMIN — HALOPERIDOL LACTATE 10 MG: 5 INJECTION, SOLUTION INTRAMUSCULAR at 21:51

## 2024-07-17 RX ADMIN — MIDAZOLAM 5 MG: 1 INJECTION INTRAMUSCULAR; INTRAVENOUS at 11:20

## 2024-07-17 RX ADMIN — HYDROXYZINE HYDROCHLORIDE 25 MG: 25 TABLET, FILM COATED ORAL at 01:48

## 2024-07-17 NOTE — BSMART NOTE
Crisis Note: [facility], [phone]; awaiting call back.    Update: 7/17/24 @ 8:32am- Spoke with ALYX Mcgee, [phone], informed that she will be conducting the evaluation this morning. Crisis will follow up for an update.

## 2024-07-17 NOTE — FLOWSHEET NOTE
07/16/24 8392   Whiteboard info   Activity In bed   Precautions   Precautions None   Negative Pressure Room No   Positive Pressure Room No   Safe Environment   Arm Bands On ID   Patient has limb restriction? No   Overbed Table Within Reach Yes   Safety Measures Standard Safety Measures   Video monitor in use No   Fall Risk Interventions   Hourly Visual Checks Quiet;Awake;In bed   Room Door Open Yes   Mobility   Level of Assistance Independent   Repositioned Turns self   Head of Bed Elevated  Self regulated   Hygiene   Level of Assistance Independent   Nutrition   Feeding Able to feed self   Tolerating PO Fluids Yes   Sitter Log   Sitter Continued   Sitter Type Staff   Indications for Sitter Patient safety     Upon rounding on pt, pt is resting in position of comfort with eyes closed, breathing even and unlabored on ED stretcher in lowest position with wheels locked, NAD noted or expressed, no further wants or needs at this time, call light within reach

## 2024-07-17 NOTE — BSMART NOTE
Crisis Note: ALYX Beckwith, informed writer that patient will be evaluated for possible TDO on the day shift. CARSON Jasso-RN, made aware.

## 2024-07-17 NOTE — BSMART NOTE
Crisis Note: ALYX Smith, informed this writer that patient will be evaluated for possible TDO on the next shift; will assist as needed.

## 2024-07-17 NOTE — FLOWSHEET NOTE
07/17/24 0204   Whiteboard info   Activity In bed   Precautions   Precautions None   Negative Pressure Room No   Positive Pressure Room No   Safe Environment   Arm Bands On ID   Overbed Table Within Reach Yes   Fall Risk Interventions   Hourly Visual Checks Quiet;Eyes closed;In bed   Room Door Open Yes   Mobility   Level of Assistance Independent   Repositioned Turns self   Head of Bed Elevated  Self regulated   Hygiene   Level of Assistance Independent   Nutrition   Feeding Able to feed self     Upon rounding on pt, pt is resting in position of comfort with eyes closed, breathing even and unlabored on ED stretcher in lowest position with wheels locked, NAD noted or expressed, no further wants or needs at this time, call light within reach

## 2024-07-17 NOTE — FLOWSHEET NOTE
07/17/24 0438   Whiteboard info   Activity In bed   Precautions   Precautions None   Negative Pressure Room No   Positive Pressure Room No   Safe Environment   Arm Bands On ID   Overbed Table Within Reach Yes   Fall Risk Interventions   Hourly Visual Checks Quiet;Eyes closed;In bed   Room Door Open Yes   Mobility   Level of Assistance Independent   Repositioned Turns self   Head of Bed Elevated  Self regulated   Hygiene   Level of Assistance Independent   Nutrition   Feeding Able to feed self     Upon rounding on pt, pt is resting in position of comfort with eyes closed, breathing even and unlabored on ED stretcher in lowest position with wheels locked, NAD noted or expressed, no further wants or needs at this time, call light within reach

## 2024-07-17 NOTE — BSMART NOTE
Crisis Note: [facility] [phone], informed crisis that she was able to evaluate patient and she will try to evaluate at a later time.

## 2024-07-17 NOTE — FLOWSHEET NOTE
07/17/24 0317   Whiteboard info   Activity In bed   Precautions   Precautions None   Negative Pressure Room No   Positive Pressure Room No   Safe Environment   Arm Bands On ID   Overbed Table Within Reach Yes   Fall Risk Interventions   Hourly Visual Checks Quiet;Eyes closed;In bed   Room Door Open Yes   Mobility   Level of Assistance Independent   Repositioned Turns self   Head of Bed Elevated  Self regulated   Hygiene   Level of Assistance Independent   Nutrition   Feeding Able to feed self     Upon rounding on pt, pt is resting in position of comfort with eyes closed, breathing even and unlabored on ED stretcher in lowest position with wheels locked, NAD noted or expressed, no further wants or needs at this time, call light within reach, sitter at bedside

## 2024-07-17 NOTE — BSMART NOTE
Crisis Note: [facility] [phone] regarding possible TDO evaluation on patient. Tang informed writer that patient is still sedated; will assist as needed.

## 2024-07-17 NOTE — BSMART NOTE
Chief Complaint   Patient presents with    Mental Health Problem       Patient was evaluated by Tele-Psych who recommends inpatient psychiatric treatment for Psychosis    Patient is not voluntary inpatient treatment.    No past medical history on file.    Prior to Visit Medications    Not on File         The following labs and vitals were reviewed with on-call psychiatrist.    BMP, CMP, CBC, ETOH, HCG, UDS, and UA    Vitals:    07/17/24 1515   BP: 114/77   Pulse: 89   Resp: 16   Temp:    SpO2: 100%         Writer met with patient to assess the following    Ambulation - Patient reports ability to ambulate without difficulty or the use of any assistive devices.    ADLs - Patient able to perform own ADLs without assistance.    DME - Patient requires none.    In consultation with on call provider [facility] provider Saige Donahue NP . Patient has been declined for inpatient psychiatric treatment at [facility] due to acuity, current displaying of agitated and verbally aggressive towards staff; as well as, patient would need to be a 1:1. Will informed [facility] of the disposition.

## 2024-07-17 NOTE — FLOWSHEET NOTE
Upon rounding on pt, pt is resting in position of comfort with eyes closed, breathing even and unlabored on ED stretcher in lowest position with wheels locked, NAD noted or expressed, no further wants or needs at this time, call light within reach    07/17/24 0035   Whiteboard info   Activity In bed   Precautions   Precautions None   Negative Pressure Room No   Positive Pressure Room No   Safe Environment   Arm Bands On ID   Patient has limb restriction? No   Overbed Table Within Reach Yes   Safety Measures Standard Safety Measures;Suicide precautions   Video monitor in use No   Fall Risk Interventions   Nursing Judgement-Fall Risk High(Add Comments) No   Hourly Visual Checks Quiet;Eyes closed;In bed   Room Door Open Yes   Mobility   Level of Assistance Independent   Repositioned Turns self   Head of Bed Elevated  Self regulated   Hygiene   Level of Assistance Independent   Nutrition   Feeding Able to feed self   Sitter Log   Sitter Type Staff   Indications for Sitter Suicidal risk     Upon rounding on pt, pt is resting in position of comfort with eyes closed, breathing even and unlabored on ED stretcher in lowest position with wheels locked, NAD noted or expressed, no further wants or needs at this time, call light within reach

## 2024-07-18 PROCEDURE — 6360000002 HC RX W HCPCS: Performed by: EMERGENCY MEDICINE

## 2024-07-18 PROCEDURE — 94761 N-INVAS EAR/PLS OXIMETRY MLT: CPT

## 2024-07-18 PROCEDURE — 96372 THER/PROPH/DIAG INJ SC/IM: CPT

## 2024-07-18 RX ORDER — LORAZEPAM 2 MG/ML
2 INJECTION INTRAMUSCULAR ONCE
Status: DISCONTINUED | OUTPATIENT
Start: 2024-07-18 | End: 2024-07-18

## 2024-07-18 RX ORDER — HALOPERIDOL 5 MG/ML
5 INJECTION INTRAMUSCULAR
Status: COMPLETED | OUTPATIENT
Start: 2024-07-18 | End: 2024-07-18

## 2024-07-18 RX ORDER — LORAZEPAM 2 MG/ML
2 INJECTION INTRAMUSCULAR ONCE
Status: COMPLETED | OUTPATIENT
Start: 2024-07-18 | End: 2024-07-18

## 2024-07-18 RX ORDER — DIPHENHYDRAMINE HYDROCHLORIDE 50 MG/ML
50 INJECTION INTRAMUSCULAR; INTRAVENOUS
Status: COMPLETED | OUTPATIENT
Start: 2024-07-18 | End: 2024-07-18

## 2024-07-18 RX ADMIN — LORAZEPAM 2 MG: 2 INJECTION INTRAMUSCULAR; INTRAVENOUS at 10:56

## 2024-07-18 RX ADMIN — DIPHENHYDRAMINE HYDROCHLORIDE 50 MG: 50 INJECTION, SOLUTION INTRAMUSCULAR; INTRAVENOUS at 10:57

## 2024-07-18 RX ADMIN — HALOPERIDOL LACTATE 5 MG: 5 INJECTION, SOLUTION INTRAMUSCULAR at 10:57

## 2024-07-18 NOTE — BSMART NOTE
Crisis Note: Placed a call for [facility] for the on-call clinician, [phone]; awaiting call back.

## 2024-07-18 NOTE — BSMART NOTE
Crisis Note: Tang [facility], informed this writer that bed search is in progress; however,  facilities are at capacity.

## 2024-07-19 PROCEDURE — 6360000002 HC RX W HCPCS: Performed by: EMERGENCY MEDICINE

## 2024-07-19 PROCEDURE — 6370000000 HC RX 637 (ALT 250 FOR IP): Performed by: STUDENT IN AN ORGANIZED HEALTH CARE EDUCATION/TRAINING PROGRAM

## 2024-07-19 PROCEDURE — 96372 THER/PROPH/DIAG INJ SC/IM: CPT

## 2024-07-19 PROCEDURE — 6360000002 HC RX W HCPCS: Performed by: STUDENT IN AN ORGANIZED HEALTH CARE EDUCATION/TRAINING PROGRAM

## 2024-07-19 RX ORDER — LORAZEPAM 2 MG/ML
2 INJECTION INTRAMUSCULAR ONCE
Status: COMPLETED | OUTPATIENT
Start: 2024-07-19 | End: 2024-07-19

## 2024-07-19 RX ORDER — NICOTINE 21 MG/24HR
1 PATCH, TRANSDERMAL 24 HOURS TRANSDERMAL
Status: COMPLETED | OUTPATIENT
Start: 2024-07-19 | End: 2024-07-20

## 2024-07-19 RX ORDER — DIPHENHYDRAMINE HYDROCHLORIDE 50 MG/ML
50 INJECTION INTRAMUSCULAR; INTRAVENOUS
Status: COMPLETED | OUTPATIENT
Start: 2024-07-19 | End: 2024-07-19

## 2024-07-19 RX ORDER — HALOPERIDOL 5 MG/ML
10 INJECTION INTRAMUSCULAR
Status: COMPLETED | OUTPATIENT
Start: 2024-07-19 | End: 2024-07-19

## 2024-07-19 RX ADMIN — LORAZEPAM 2 MG: 2 INJECTION INTRAMUSCULAR; INTRAVENOUS at 03:26

## 2024-07-19 RX ADMIN — HALOPERIDOL LACTATE 10 MG: 5 INJECTION, SOLUTION INTRAMUSCULAR at 03:24

## 2024-07-19 RX ADMIN — LORAZEPAM 2 MG: 2 INJECTION INTRAMUSCULAR; INTRAVENOUS at 14:50

## 2024-07-19 RX ADMIN — DIPHENHYDRAMINE HYDROCHLORIDE 50 MG: 50 INJECTION, SOLUTION INTRAMUSCULAR; INTRAVENOUS at 03:24

## 2024-07-19 NOTE — BH NOTE
Court hearing held for pt 07/19/2024. Court ruled pt to be involuntarily committed to any accepting facility.     -Ari Sow,

## 2024-07-19 NOTE — ED PROVIDER NOTES
05:15:Pt care assumed from Dr. Kimble , ED provider. Pt complaint(s), current treatment plan, progression and available diagnostic results have been discussed thoroughly. The patient was seen and evaluated on my shift.   Rounding occurred: Yes  Intended Disposition: Admission to [facility]  Pending diagnostic reports and/or labs (please list): Transfer to [facility]     Transfer NOTE:  18:00 [id number]    Patient care transferred to Dr. Matson in stable condition with disposition pending placement by Crisis vs transfer to [facility].    Discussed available diagnostic results and care plan at length.    MD Jaime Caceres III, Clyde D III, MD  07/20/24 0609    
 Dr. Vizcarra taking over patient care.  Patient medically cleared at this time.     Johnny Vizcarra Jr., DO  07/17/24 0416    
 I received the patient in turnover from Dr. Benjamin at the end of his shift.  I initially evaluated the patient yesterday along with the PA.  The patient is a 33-year-old woman with a history of bipolar disorder who is acutely psychotic.  At this time we are awaiting placement through crisis.     Lizy Matson MD  07/17/24 0641       Lizy Matson MD  07/19/24 2006       Lizy Matson MD  07/20/24 5747    
7:32 AM :Pt care assumed from Dr. Blunt , ED provider. Pt complaint(s), current treatment plan, progression and available diagnostic results have been discussed thoroughly. The patient was seen and evaluated on my shift.   Rounding occurred: Yes  Intended Disposition: TBD  Pending diagnostic reports and/or labs (please list): meds for sedation given, agitated, TDO, waiting on placement plan      Patient became agitated again and required chemical treatment for her agitation yesterday and today started slamming the door and yelling and was given 2 mg Ativan without any relief and so Haldol 5 mg and Benadryl 50 were also added to the medication and will continue to monitor the patient.  The patient remains in forensic restraints. Freddie Lechuga DO 10:57 AM       Freddie Lechuga MD  07/20/24 1494    
shift. Please see their note for further work-up and final disposition. Patient is pending TDO.    ED Course as of 24 1807   e 2024   1303 Believe the correct name and  found. Printed a copy of drivers license. Patient confirms that it is her. States she was concerned about warrants out for her arrest.  [NG]   1305 Able to find a alternate contact in the patients chart for her brother Norm Gaona. Nursing staff attempted to call, but the number is disconnected. [NG]   1316 Reviewed patient's chart from recent stay at [facility] (2023). Found a contact named Tracy McArdle listed as a family member. Patient reports that this is her mother and she would like for us to call her. She was called by nursing staff April.  [NG]   1432 Spoke with tele-psych who is recommending admission. State that she is very tangential. Reports that she did disclose using meth.  [NG]   1509 Patient becoming very agitated with staff. Yelling. Will put in order for geodon.  [NG]   1522 WBC(!): 15.3  Leukocytosis.  Patient appears to have a history of leukocytosis when compared to labs completed in her other chart. [NG]   1535 Patient once again very agitated despite geodon. Patient is yelling at staff and walking out of her room threatening staff and other patients. Orders placed for versed.  [NG]   1601 I went and spoke to the patient. She reports wanting something for anxiety. States that she will calm down and stay in her room.  [NG]      ED Course User Index  [NG] Arely Mendiola, EVE       Diagnosis     Clinical Impression:   1. Acute psychosis (HCC)    2. Polysubstance abuse (HCC)        Disposition: Pending     No follow-up provider specified.        Medication List      You have not been prescribed any medications.          Dictation disclaimer:  Please note that this dictation was completed with [employer], the [employer] voice recognition software.  Quite often unanticipated grammatical, syntax, homophones,

## 2024-07-19 NOTE — BSMART NOTE
CRISIS NOTE:  Received a call from JUVE Linton checking to see if Patient still needed a bed; and on recent conduct in the ED.  Advised bed is still needed, and Pt showing some verbal aggression but no physical aggression. Referral will be reviewed.                 Marianne Garza, LAXMI, CSAC, CADC  [employer] Counselor

## 2024-07-20 PROCEDURE — 6360000002 HC RX W HCPCS: Performed by: STUDENT IN AN ORGANIZED HEALTH CARE EDUCATION/TRAINING PROGRAM

## 2024-07-20 PROCEDURE — 6370000000 HC RX 637 (ALT 250 FOR IP): Performed by: PHYSICIAN ASSISTANT

## 2024-07-20 PROCEDURE — 2580000003 HC RX 258: Performed by: STUDENT IN AN ORGANIZED HEALTH CARE EDUCATION/TRAINING PROGRAM

## 2024-07-20 PROCEDURE — 6370000000 HC RX 637 (ALT 250 FOR IP): Performed by: EMERGENCY MEDICINE

## 2024-07-20 PROCEDURE — 90792 PSYCH DIAG EVAL W/MED SRVCS: CPT | Performed by: NURSE PRACTITIONER

## 2024-07-20 PROCEDURE — 6370000000 HC RX 637 (ALT 250 FOR IP): Performed by: STUDENT IN AN ORGANIZED HEALTH CARE EDUCATION/TRAINING PROGRAM

## 2024-07-20 PROCEDURE — 6360000002 HC RX W HCPCS: Performed by: EMERGENCY MEDICINE

## 2024-07-20 PROCEDURE — 96372 THER/PROPH/DIAG INJ SC/IM: CPT

## 2024-07-20 RX ORDER — IBUPROFEN 600 MG/1
600 TABLET ORAL
Status: COMPLETED | OUTPATIENT
Start: 2024-07-20 | End: 2024-07-20

## 2024-07-20 RX ORDER — IBUPROFEN 600 MG/1
600 TABLET ORAL ONCE
Status: COMPLETED | OUTPATIENT
Start: 2024-07-20 | End: 2024-07-20

## 2024-07-20 RX ORDER — OLANZAPINE 5 MG/1
10 TABLET ORAL NIGHTLY
Status: DISCONTINUED | OUTPATIENT
Start: 2024-07-20 | End: 2024-07-22 | Stop reason: HOSPADM

## 2024-07-20 RX ORDER — MIDAZOLAM HYDROCHLORIDE 1 MG/ML
5 INJECTION INTRAMUSCULAR; INTRAVENOUS ONCE
Status: DISCONTINUED | OUTPATIENT
Start: 2024-07-20 | End: 2024-07-20

## 2024-07-20 RX ORDER — LIDOCAINE HYDROCHLORIDE 20 MG/ML
15 SOLUTION OROPHARYNGEAL
Status: COMPLETED | OUTPATIENT
Start: 2024-07-20 | End: 2024-07-20

## 2024-07-20 RX ORDER — MIDAZOLAM HYDROCHLORIDE 1 MG/ML
2 INJECTION INTRAMUSCULAR; INTRAVENOUS ONCE
Status: COMPLETED | OUTPATIENT
Start: 2024-07-20 | End: 2024-07-20

## 2024-07-20 RX ORDER — LORAZEPAM 1 MG/1
2 TABLET ORAL ONCE
Status: COMPLETED | OUTPATIENT
Start: 2024-07-20 | End: 2024-07-20

## 2024-07-20 RX ADMIN — LORAZEPAM 2 MG: 1 TABLET ORAL at 05:18

## 2024-07-20 RX ADMIN — ZIPRASIDONE MESYLATE 20 MG: 20 INJECTION, POWDER, LYOPHILIZED, FOR SOLUTION INTRAMUSCULAR at 13:28

## 2024-07-20 RX ADMIN — OLANZAPINE 10 MG: 5 TABLET, FILM COATED ORAL at 04:43

## 2024-07-20 RX ADMIN — IBUPROFEN 600 MG: 600 TABLET, FILM COATED ORAL at 12:29

## 2024-07-20 RX ADMIN — MIDAZOLAM 2 MG: 1 INJECTION INTRAMUSCULAR; INTRAVENOUS at 05:22

## 2024-07-20 RX ADMIN — IBUPROFEN 600 MG: 600 TABLET, FILM COATED ORAL at 05:18

## 2024-07-20 RX ADMIN — LIDOCAINE HYDROCHLORIDE 15 ML: 20 SOLUTION ORAL at 12:29

## 2024-07-20 NOTE — BSMART NOTE
Crisis Note: Patient is Involuntary Committed by the court. Bed search is in progress by ALYX; call placed to [facility], [phone], re: bed search updates; awaiting return call.    7/20/24 @ 19:58 pm  ALYX Nam, returned call and informed writer that bed search is still in progress the patient. Viv reported that patient had been accepted at [facility]. Per Viv, [facility] were not available to transport the patient to [facility] until Monday, 7/22/24 d/t  Dept is closed for the weekend. Unfortunately, [facility] could not hold the bed until Monday. Spoke also with ALYX Cook, Supervisor, [phone], who notified this writer that she inquired with the [place] [employer] re: courtesy transportation for patient from [facility] to [facility]; however, this was unsuccessful, as well. Crisis will assist as needed.

## 2024-07-20 NOTE — BSMART NOTE
Crisis Note:  Paged [facility] for update on TDO bed search.  Spoke with Arely who advised Patient is on the wait list for [facility]; and has been denied everywhere else due to aggression.  She does not know where the Patient is on the list.         Marianne Garza, LPC, CSAC, CADC  [employer] Counselor

## 2024-07-21 PROCEDURE — 6370000000 HC RX 637 (ALT 250 FOR IP): Performed by: EMERGENCY MEDICINE

## 2024-07-21 PROCEDURE — 6370000000 HC RX 637 (ALT 250 FOR IP): Performed by: STUDENT IN AN ORGANIZED HEALTH CARE EDUCATION/TRAINING PROGRAM

## 2024-07-21 PROCEDURE — 96372 THER/PROPH/DIAG INJ SC/IM: CPT

## 2024-07-21 PROCEDURE — 6360000002 HC RX W HCPCS: Performed by: EMERGENCY MEDICINE

## 2024-07-21 RX ORDER — LIDOCAINE HYDROCHLORIDE 20 MG/ML
15 SOLUTION OROPHARYNGEAL
Status: DISCONTINUED | OUTPATIENT
Start: 2024-07-21 | End: 2024-07-21

## 2024-07-21 RX ORDER — MORPHINE SULFATE 15 MG/1
7.5 TABLET ORAL ONCE
Status: COMPLETED | OUTPATIENT
Start: 2024-07-21 | End: 2024-07-21

## 2024-07-21 RX ORDER — ACETAMINOPHEN 325 MG/1
650 TABLET ORAL
Status: COMPLETED | OUTPATIENT
Start: 2024-07-21 | End: 2024-07-21

## 2024-07-21 RX ORDER — OLANZAPINE 5 MG/1
10 TABLET ORAL ONCE
Status: COMPLETED | OUTPATIENT
Start: 2024-07-21 | End: 2024-07-21

## 2024-07-21 RX ORDER — MIDAZOLAM HYDROCHLORIDE 1 MG/ML
5 INJECTION INTRAMUSCULAR; INTRAVENOUS ONCE
Status: COMPLETED | OUTPATIENT
Start: 2024-07-21 | End: 2024-07-21

## 2024-07-21 RX ORDER — IBUPROFEN 400 MG/1
400 TABLET ORAL EVERY 6 HOURS PRN
Status: DISCONTINUED | OUTPATIENT
Start: 2024-07-21 | End: 2024-07-22 | Stop reason: HOSPADM

## 2024-07-21 RX ORDER — ACETAMINOPHEN 500 MG
1000 TABLET ORAL EVERY 6 HOURS PRN
Status: DISCONTINUED | OUTPATIENT
Start: 2024-07-21 | End: 2024-07-22 | Stop reason: HOSPADM

## 2024-07-21 RX ORDER — IBUPROFEN 600 MG/1
600 TABLET ORAL
Status: DISCONTINUED | OUTPATIENT
Start: 2024-07-21 | End: 2024-07-21

## 2024-07-21 RX ORDER — ONDANSETRON 4 MG/1
4 TABLET, ORALLY DISINTEGRATING ORAL ONCE
Status: COMPLETED | OUTPATIENT
Start: 2024-07-21 | End: 2024-07-21

## 2024-07-21 RX ORDER — NICOTINE 21 MG/24HR
1 PATCH, TRANSDERMAL 24 HOURS TRANSDERMAL DAILY
Status: DISCONTINUED | OUTPATIENT
Start: 2024-07-21 | End: 2024-07-22 | Stop reason: HOSPADM

## 2024-07-21 RX ADMIN — MORPHINE SULFATE 7.5 MG: 15 TABLET ORAL at 13:56

## 2024-07-21 RX ADMIN — OLANZAPINE 10 MG: 5 TABLET, FILM COATED ORAL at 04:13

## 2024-07-21 RX ADMIN — MIDAZOLAM 5 MG: 1 INJECTION INTRAMUSCULAR; INTRAVENOUS at 20:51

## 2024-07-21 RX ADMIN — OLANZAPINE 10 MG: 5 TABLET, FILM COATED ORAL at 19:14

## 2024-07-21 RX ADMIN — ONDANSETRON 4 MG: 4 TABLET, ORALLY DISINTEGRATING ORAL at 13:55

## 2024-07-21 RX ADMIN — ACETAMINOPHEN 1000 MG: 500 TABLET ORAL at 20:30

## 2024-07-21 RX ADMIN — ACETAMINOPHEN 325MG 650 MG: 325 TABLET ORAL at 04:00

## 2024-07-21 RX ADMIN — IBUPROFEN 400 MG: 400 TABLET, FILM COATED ORAL at 13:55

## 2024-07-21 RX ADMIN — IBUPROFEN 400 MG: 400 TABLET, FILM COATED ORAL at 20:30

## 2024-07-22 VITALS
TEMPERATURE: 98.3 F | HEIGHT: 64 IN | WEIGHT: 135 LBS | RESPIRATION RATE: 20 BRPM | DIASTOLIC BLOOD PRESSURE: 65 MMHG | HEART RATE: 109 BPM | BODY MASS INDEX: 23.05 KG/M2 | SYSTOLIC BLOOD PRESSURE: 123 MMHG | OXYGEN SATURATION: 99 %

## 2024-07-22 LAB
EKG ATRIAL RATE: 104 BPM
EKG DIAGNOSIS: NORMAL
EKG P AXIS: 51 DEGREES
EKG P-R INTERVAL: 150 MS
EKG Q-T INTERVAL: 338 MS
EKG QRS DURATION: 82 MS
EKG QTC CALCULATION (BAZETT): 444 MS
EKG R AXIS: 64 DEGREES
EKG T AXIS: 47 DEGREES
EKG VENTRICULAR RATE: 104 BPM

## 2024-07-22 PROCEDURE — 6360000002 HC RX W HCPCS: Performed by: STUDENT IN AN ORGANIZED HEALTH CARE EDUCATION/TRAINING PROGRAM

## 2024-07-22 PROCEDURE — 6360000002 HC RX W HCPCS: Performed by: EMERGENCY MEDICINE

## 2024-07-22 PROCEDURE — 6370000000 HC RX 637 (ALT 250 FOR IP): Performed by: STUDENT IN AN ORGANIZED HEALTH CARE EDUCATION/TRAINING PROGRAM

## 2024-07-22 PROCEDURE — 93010 ELECTROCARDIOGRAM REPORT: CPT | Performed by: INTERNAL MEDICINE

## 2024-07-22 PROCEDURE — 2580000003 HC RX 258: Performed by: STUDENT IN AN ORGANIZED HEALTH CARE EDUCATION/TRAINING PROGRAM

## 2024-07-22 PROCEDURE — 94761 N-INVAS EAR/PLS OXIMETRY MLT: CPT

## 2024-07-22 PROCEDURE — 93005 ELECTROCARDIOGRAM TRACING: CPT | Performed by: STUDENT IN AN ORGANIZED HEALTH CARE EDUCATION/TRAINING PROGRAM

## 2024-07-22 PROCEDURE — 2580000003 HC RX 258: Performed by: EMERGENCY MEDICINE

## 2024-07-22 PROCEDURE — 6370000000 HC RX 637 (ALT 250 FOR IP): Performed by: EMERGENCY MEDICINE

## 2024-07-22 PROCEDURE — 96372 THER/PROPH/DIAG INJ SC/IM: CPT

## 2024-07-22 RX ORDER — MIDAZOLAM HYDROCHLORIDE 1 MG/ML
5 INJECTION INTRAMUSCULAR; INTRAVENOUS ONCE
Status: COMPLETED | OUTPATIENT
Start: 2024-07-22 | End: 2024-07-22

## 2024-07-22 RX ORDER — LIDOCAINE HYDROCHLORIDE 20 MG/ML
15 SOLUTION OROPHARYNGEAL
Status: DISCONTINUED | OUTPATIENT
Start: 2024-07-22 | End: 2024-07-22

## 2024-07-22 RX ORDER — ACETAMINOPHEN 325 MG/1
650 TABLET ORAL
Status: COMPLETED | OUTPATIENT
Start: 2024-07-22 | End: 2024-07-22

## 2024-07-22 RX ORDER — LORAZEPAM 1 MG/1
2 TABLET ORAL ONCE
Status: COMPLETED | OUTPATIENT
Start: 2024-07-22 | End: 2024-07-22

## 2024-07-22 RX ORDER — LORAZEPAM 1 MG/1
1 TABLET ORAL
Status: COMPLETED | OUTPATIENT
Start: 2024-07-22 | End: 2024-07-22

## 2024-07-22 RX ORDER — DIPHENHYDRAMINE HYDROCHLORIDE 50 MG/ML
50 INJECTION INTRAMUSCULAR; INTRAVENOUS
Status: COMPLETED | OUTPATIENT
Start: 2024-07-22 | End: 2024-07-22

## 2024-07-22 RX ORDER — HYDROXYZINE HYDROCHLORIDE 25 MG/1
25 TABLET, FILM COATED ORAL
Status: COMPLETED | OUTPATIENT
Start: 2024-07-22 | End: 2024-07-22

## 2024-07-22 RX ORDER — KETOROLAC TROMETHAMINE 15 MG/ML
15 INJECTION, SOLUTION INTRAMUSCULAR; INTRAVENOUS
Status: COMPLETED | OUTPATIENT
Start: 2024-07-22 | End: 2024-07-22

## 2024-07-22 RX ADMIN — LORAZEPAM 2 MG: 1 TABLET ORAL at 17:44

## 2024-07-22 RX ADMIN — WATER 10 MG: 1 INJECTION INTRAMUSCULAR; INTRAVENOUS; SUBCUTANEOUS at 10:19

## 2024-07-22 RX ADMIN — LORAZEPAM 1 MG: 1 TABLET ORAL at 20:14

## 2024-07-22 RX ADMIN — KETOROLAC TROMETHAMINE 15 MG: 15 INJECTION, SOLUTION INTRAMUSCULAR; INTRAVENOUS at 20:12

## 2024-07-22 RX ADMIN — WATER 10 MG: 1 INJECTION INTRAMUSCULAR; INTRAVENOUS; SUBCUTANEOUS at 02:25

## 2024-07-22 RX ADMIN — OLANZAPINE 10 MG: 5 TABLET, FILM COATED ORAL at 20:13

## 2024-07-22 RX ADMIN — ACETAMINOPHEN 325MG 650 MG: 325 TABLET ORAL at 20:13

## 2024-07-22 RX ADMIN — MIDAZOLAM 5 MG: 1 INJECTION INTRAMUSCULAR; INTRAVENOUS at 03:13

## 2024-07-22 RX ADMIN — DIPHENHYDRAMINE HYDROCHLORIDE 50 MG: 50 INJECTION, SOLUTION INTRAMUSCULAR; INTRAVENOUS at 02:25

## 2024-07-22 RX ADMIN — HYDROXYZINE HYDROCHLORIDE 25 MG: 25 TABLET, FILM COATED ORAL at 20:14

## 2024-07-22 NOTE — VIRTUAL HEALTH
Telepsych  Crisis 24-Hour Reassessment       C-SSRS Screening Completed:   Pt refused to participate     Current Suicide Risk (Low, Moderate, High):   Unable to complete pt using foul language and states \"get the fuck out of my room and go fuck yourself\"    Mental Status Exam:     Sensorium  oriented to time, place and person   Orientation Place and situation    Relations ANGRY   Eye Contact intense   Appearance:  age appropriate, disheveled, and poor hygiene   Motor Behavior:  restless   Speech:  loud   Vocabulary average   Thought Process: goal directed   Thought Content delusions   Suicidal ideations Unable to assess   Homicidal ideations Unable to assess    Mood:  irritable   Affect:  normal and increased in intensity   Memory recent  adequate   Memory remote:  adequate   Concentration:  adequate   Abstraction:  abstract   Insight:  limited   Reliability poor   Judgment:  limited       Updated Collateral:   When writer asked about reasons for being here and being SI    \" I was just angry,there is nothing to do to un-anger me \". Pt then sat up in the bed looked intensely at tel- doc eyes widened and yelled \"I would love to do the outpatient, get the fuck out of my room and go fuck yourself \".    ED staff has reported to this writer pt has been \".simmons\".    Brief Summary:  Patient is a 33 y.o. Unavailable female who presents for psychosis. Patient presented to the ED on 07/16/24 from EMS and PD.  Recorded, pt took off all of her clothes and began screaming. Pt goes from talking in a preschooler voice to talking inan adult voice and yelling and cussing. Pt given paper scrubs, but refusing to put them on. Pt refusing blood pressure and ripping off cuff, yelling \"You ain't gonna take my fucking arm today!\" Pt very labile. Pt goes from rapid speech to slow speech, with flight of ideas to not making sense.     Updated Level of Care/Disposition:    Recommendation to continue search for placement. Pt continuing to 
Myla Ta  [id number]  1990     Social Work Behavioral Health Crisis Assessment    07/16/24    Chief Complaint: Psychosis    HPI: Patient is a 33 y.o. Unavailable female who presents for psychosis. Patient presented to the ED on 07/16/24 from EMS and PD.  Recorded, pt took off all of her clothes and began screaming. Pt goes from talking in a preschooler voice to talking inan adult voice and yelling and cussing. Pt given paper scrubs, but refusing to put them on. Pt refusing blood pressure and ripping off cuff, yelling \"You ain't gonna take my fucking arm today!\" Pt very labile. Pt goes from rapid speech to slow speech, with flight of ideas to not making sense.   Pt has history of bipolar disorder per her presenting to the emergency department due to psychosis.  Patient is accompanied by EMS and PD.  Patient is not providing any information.  EMS states that they were attempting to determine her identity, but she was not giving them any information and does not have ID.  Reports that they found her walking along the road and a bystander called because she fell asleep in their front yard.   Pt reported, \"I am fighting a nap, I need a safe place to be, I want to be there to be a nap, I can do my best to answer. I am a meow, and I want to save me or letting me go now. Just a lick, I was over, I did not, no. It smells, it smells so much. He checks himself before he gets himself, my boyfriend needs to be here. I am not naughty, I am just a baby. I will suffer nice, please don't kill me, I believe I have a chance.\"    Past Psychiatric History:  Previous Diagnoses/symptoms: \"I don't want to disclose that, CPTSD\".   Previous suicide attempts/self-harm: \"I did in the past, I did\".  Inpatient psychiatric hospitalizations: no  Current outpatient psychiatric provider: Denies  Current therapist: States not in therapy  Previous psychiatric medication trials: No prior medication trials  Current psychiatric medications: No 
Myla Ta, was evaluated through a synchronous (real-time) audio-video encounter. The patient (and/or guardian if applicable) is aware that this is a billable service, which includes applicable co-pays. This virtual visit was conducted with patient's (and/or legal guardian's) consent. Patient identification was verified, and a caregiver was present when appropriate.  The patient was located at Facility (Appt Department): [facility] EMERGENCY DEPT  [address] [address]  Loc: [phone]  The provider was located at Home (City/State): [place]  Confirm you are appropriately licensed, registered, or certified to deliver care in the state where the patient is located as indicated above. If you are not or unsure, please re-schedule the visit: Yes, I confirm.   Consults   EMERGENCY DEPARTMENT PSYCHIATRIC PROGRESS VISIT    Date of Service: 7/20/2024    Purpose:    History  From: Chart review, Patient  Record Review: moderate      CC: Reassessment for psychosis      Per Record:  Patient presented to the ED on 07/16/24 from EMS and PD.  Recorded, pt took off all of her clothes and began screaming. Pt goes from talking in a preschooler voice to talking inan adult voice and yelling and cussing. Pt given paper scrubs, but refusing to put them on. Pt refusing blood pressure and ripping off cuff, yelling \"You ain't gonna take my fucking arm today!\" Pt very labile. Pt goes from rapid speech to slow speech, with flight of ideas to not making sense.  Pt reported, \"I am just not, no, I have no rights, and you don't know me\".        HPI:   This is a 33 y.o. female patient who is in ED awaiting a bed and is being seen for a 24 hour reassessment.  Patient is agreeable to the interview.  Patient is alert and oriented to self but did not answer the other questions.  Patient has rapid speech and circumstantial/tangential thinking.  Patient is pacing around the room or will get very close to the camera.  
Tele psych assessment not needed at this time, patient is ready to transfer and is going to [facility] .    --TALIA Turk on 7/22/2024 at 6:31 PM    An electronic signature was used to authenticate this note.    
Telepsych  Crisis 24-Hour Reassessment       C-SSRS Screening Completed:  No     Current Suicide Risk (Low, Moderate, High):   Unable to complete - Pt used foul language, loudly, and stated several times, \"get out of my room bitch, I am chained to a bed, get out\".    Mental Status Exam:     Sensorium  oriented to time, place and person   Orientation person, place, and situation   Relations guarded and angry   Eye Contact intense   Appearance:  age appropriate, disheveled, and poor hygiene   Motor Behavior:  restless   Speech:  loud and profane   Vocabulary average   Thought Process: goal directed   Thought Content delusions   Suicidal ideations Unable to assess   Homicidal ideations none   Mood:  angry   Affect:  increased in intensity   Memory recent  adequate   Memory remote:  adequate   Concentration:  adequate   Abstraction:  abstract   Insight:  limited   Reliability poor   Judgment:  limited       Updated Collateral:   No new collateral information     Brief Summary:  Patient is a 33 y.o. female who presents for psychosis. Patient presented to the ED on 07/16/24 from EMS and PD.  Recorded, pt took off all of her clothes and began screaming. Pt goes from talking in a preschooler voice to talking inan adult voice and yelling and cussing. Pt given paper scrubs, but refusing to put them on. Pt refusing blood pressure and ripping off cuff, yelling \"You ain't gonna take my fucking arm today!\" Pt very labile. Pt goes from rapid speech to slow speech, with flight of ideas to not making sense.   During assessment pt became very upset, agitated, pt has history of becoming verbally aggressive and had expressed that anger during attempting to reassess.     Updated Level of Care/Disposition:    Pt continues to be in route for inpatient facility, pt has history of bipolar and aggressive behaviors.   Pt continues to wait for placement, and needing to be assessed for packet completion.     Safety Plan (Developed/Reviewed): 
Telepsych  Crisis 24-Hour Reassessment       C-SSRS Screening Completed: Yes    Current Suicide Risk (Low, Moderate, High):   Low    Mental Status Exam:     Sensorium  oriented to time, place and person   Orientation person, place, time/date, and situation   Relations cooperative, unreliable, and vague   Eye Contact intense   Appearance:  disheveled   Motor Behavior:  hyperactive   Speech:  increased latency of response and profane   Vocabulary average   Thought Process: flight of ideas, loose associations, and tangential   Thought Content delusions   Suicidal ideations no plan  and no intention   Homicidal ideations no plan  and no intention   Mood:  anxious, euthymic, and irritable   Affect:  mood-incongruent   Memory recent  impaired   Memory remote:  impaired   Concentration:  impaired   Abstraction:  abstract   Insight:  impaired    Reliability poor   Judgment:  impaired        Updated Collateral:   Call my sister, Celine Hull, #:[phone]  \"Tell her I love her, and I am trying to get well, like if they have thoughts and financially, I want them to know\"   - contacted no answer   Boyfriend's number, \"have all my stuff, all of us alive\".  # [phone]  -contacted no answer      Brief Summary:  Patient is a 33 y.o. Unavailable female who presents for psychosis. Patient presented to the ED on 07/16/24 from EMS and PD.  Recorded, pt took off all of her clothes and began screaming. Pt goes from talking in a preschooler voice to talking inan adult voice and yelling and cussing. Pt given paper scrubs, but refusing to put them on. Pt refusing blood pressure and ripping off cuff, yelling \"You ain't gonna take my fucking arm today!\" Pt very labile. Pt goes from rapid speech to slow speech, with flight of ideas to not making sense.   \"I have thought about passed things where I failed, I have a lot to talk about, I had things to get through it, things are not okay, who ever the mahnazk told them that, was it 
This virtual visit was conducted with patient's (and/or legal guardian's) consent. Patient identification was verified, and a caregiver was present when appropriate.  The patient was located at Facility (Appt Department): [facility] EMERGENCY DEPT  [address] [address]  Loc: [phone]  The provider was located at Home (City/State): [place], [place]  Confirm you are appropriately licensed, registered, or certified to deliver care in the state where the patient is located as indicated above. If you are not or unsure, please re-schedule the visit: Yes, I confirm.   Consults     Total time spent on this encounter: Not billed by time    --Brandie Reynaga LCSW on 7/19/2024 at 12:48 PM    An electronic signature was used to authenticate this note.

## 2024-07-23 NOTE — ED NOTES
..Bedside and Verbal shift change report given to NOHEMI Thompson (ongoing nurse) to NOHEMI Gage (oncoming nurse). Report included the following information ED SBAR.    
..Bedside shift change report given to NOHEMI Friedman  (oncoming nurse) by NOHEMI Garcia (offgoing nurse). Report included the following information ED SBAR.    
8:52 PM : Pt care transferred to Dr. Matson  ,ED provider. History of patient complaint(s), available diagnostic reports and current treatment plan has been discussed thoroughly.   Bedside rounding on patient occured : yes .  Intended disposition of patient : Pending  Pending diagnostics reports and/or labs (please list): None    Patient continues to be extremely combative yelling at nursing staff and police officers in the ED.    Under a TDO    9:37 PM -Haldol 10 mg IM, Benadryl 50 mg IM, and Ativan 50 mg IM ordered    Critical Care Procedure Note  Authorized and Performed by: Dr. Manuel Blunt DO  Total critical care time: 35 minutes  Due to a high probability of clinically significant, life threatening deterioration, the patient required my highest level of preparedness to intervene emergently and I personally spent this critical care time directly and personally managing the patient. This critical care time included obtaining a history; examining the patient; pulse oximetry; ordering and review of studies; arranging urgent treatment with development of a management plan; evaluation of patient's response to treatment; frequent reassessment; and, discussions with other providers.  This critical care time was performed to assess and manage the high probability of imminent, life-threatening deterioration that could result in multi-organ failure. It was exclusive of separately billable procedures and treating other patients and teaching time.      5:30 AM behavioral health Representative Ginna reports that the [facility] is performing a bed search    7:16 AM : Pt care transferred to Dr. Lechuga  ,ED provider. History of patient complaint(s), available diagnostic reports and current treatment plan has been discussed thoroughly.   Bedside rounding on patient occured : Yes .  Intended disposition of patient : Placement  Pending diagnostics reports and/or labs (please list): Pending      Diagnosis: 
[facility], patient awake, alert and manic, crying and yelling at staff, standing on the bed. Patient states\" GET THIS BLACK ASS MOTHERFUCKER OUT MY ROOM\" patient is not redirectable at this time. Provider made aware at this time.  
Assumed care of patient. Patient sitting on edge of bed with hair wrapped in a towel, wearing blue scrubs, red socks. Patient eating. Quiet. Asking if she is able to take a shower. No respiratory distress observed. Sitter has patient within line of sight.  
Assumed care of pt from April,RN. Pt answers questions inappropriately. Pt involuntary hold waiting for placement. RR even and non labored. Pt in blue scrubs. Room made safe.   
Assumed care of pt reports bilateral foot pain 6/10. Pt denies SI/HI/AH/VH pt awaiting transport to  psych.   
Assumed care of pt, received report from Too SONG  
Assumed care of pt.    Pt resting in position of comfort on ED stretcher in lowest position with wheels locked, NAD noted, monitor in place, call light within reach   
Bedside and Verbal shift change report given to Daisy (oncoming nurse) by Norm (offgoing nurse). Report included the following information Nurse Handoff Report, Index, ED Encounter Summary, and ED SBAR.     
Bedside shift change report given to NOHEMI Zheng (oncoming nurse) by NOHEMI Roche (offgoing nurse). Report included the following information Nurse Handoff Report.     
Bedside shift report given to NOHEMI Frost.   
I received the patient in turnover again from Dr. Sandoval at the end of his shift.  The patient is a 33-year-old woman with acute psychosis and bipolar disorder, who was brought to the ED several days ago after running around in the street.      At this time she is under an IVC and awaiting placement.     Lizy Matson MD  07/24/24 2635    
I received the patient in turnover from Dr. Sandoval at the end of his shift.  The patient is a 33-year-old woman with a history of bipolar, who presented to the ED with acute psychosis.  She is awaiting placement through [facility] and crisis.     Lizy Matson MD  07/19/24 2007    
I received the patient in turnover from Dr. Sandoval at the end of his shift.  The patient is a 33-year-old woman with bipolar disorder who is acutely psychotic.  She required Geodon today.  She is awaiting placement.  Right now she is involuntarily committed.     Lizy Matson MD  07/20/24 2048    
[facility] in to see patient at this time unable to assess patient at this time d/t patient to sleepy.   
Patient becoming more and more agitated and is starting to scream at staff.  Orders obtained for medication to help calm patient down.   
Patient given a clean set of blue scrubs as well as toothbrush, toothpaste, cleansing wipes and clean socks.  Patients bed linens were also changed.  
Patient given a lunch tray  
Patient given medications. Calm, cooperative.   
Patient given peanut butter sandwich and ice water.   
Patient given snacks and 2 soda's with ice.  
Patient is a TDO-[facility] bed search waiting list for [facility].  
Patient is screaming and using vulgar language. Patient is cursing at sitter and RN. RN notified Jaime CARO.  
Patient laying in bed eyes closed, blankets up to shoulders. No respiratory distress observed. Sitter has patient within line of sight.  
Patient laying supine, eyes closed, wearing blue scrubs, red socks. No respiratory distress observed. Sitter has patient within line of sight.  
Patient provided with  a breakfast tray  
Patient requesting for someone to call her boyfriend Johnny @ [phone] to find out where her possessions are and if they are safe.  Patient states she wants her stuff given to Araceli (housekeeping) or Will who lives on the end.  
Patient showered, brushed teeth, changed into clean scrubs. Dirty linen removed from room and clean linen placed. Floor swept and mopped.   
Per , pt will be an involuntary commit  
Police officers no longer at bedside and pt no longer in forensic restraints.  
Possible pt contact found in previous chart. Contact states as pts brother, Norm Gaona. Attempted to contact Norm, but was unable due to contacts phone being disconnected/number changed.  
Pt becoming manic and shouting. Pt given medication per MAR orders. Pt walked in [place] to alleviate anxiety with positive results.   
Pt becoming restless and asking for an IM so she can sleep. MD Matson made aware.Pt medicated per MAR orders.  
Pt began to yell out \"shut the fuck up\" and \"I need meds for anxiety.\" Dr. Blunt was notified.  
Pt cindy and demanding to have IV removed, per md ok to dc iv  
Pt continues to be agitated and yelling, being disruptive to other patients    Pt medicated per MAR  
Pt court appointed  at bedside for hearing this morning  
Pt is currently asleep on the ED stretcher in [facility]. Chest is rising and falling at a steady rate. Bed is at the lowest position with brakes on and x2 rails up. Law enforcement is at bedside.  
Pt medicated per mar, allergies verified  
Pt noted to be yelling and becoming more agitated.    Pt mediated per MAR  
Pt provided clean scrubs, wash cloths, socks, and underwear.  
Pt put on cardiac, sp02, respiratory, and bp monitor.  
Pt refused to have her vitals taken. She was educated on the importance of monitoring her vitals. She continued to refuse.  
Pt reports pain and anxiety, provider placed orders, Pt allergies verified pt medicated per MAR. Pt tolerating Po intake w/o incident   
Pt resting with eyes closed at this time, chest is visibly rising and falling, will continue to monitor  
Pt resting with eyes closed. RR even and non labored.   
Pt started yelling and became non redirectable. MD Matson made aware. Pt medicated per MAR orders.   
Pt took off all of her clothes and began screaming. Pt goes from talking in a preschooler voice to talking inan adult voice and yelling and cussing. Pt given paper scrubs, but refusing to put them on. Pt refusing blood pressure and ripping off cuff, yelling \"You ain't gonna take my fucking arm today!\" Pt very labile. Pt goes from rapid speech to slow speech, with flight of ideas to not making sense. Pt denies SI or HI.  
Pt uncuffed from stretcher, amb to bathroom, recuffed to bed by [employer], pt yelling and belligerent at times  
Pt was escorted to the restroom by law enforcement.  
Pt yells out at times, sitter observing pt  
Received call from [facility] that they will most likely be accepting the pt.  
Report given to Chrissy SONG. No further questions  
Report given to Kimberly SONG . No further questions.  
Report given to Petra SONG. No further questions.  
Report given to Purvi SONG from [facility]. No further questions.  
The patient did not respond to Geodon and Versed.  She is screaming, yelling, and is very combative.      I have ordered 2 mg/kg of IV ketamine along with 50 mg of IV Benadryl.    The patient is continuing to have outbursts.  I have ordered 2 mg of IV Ativan.    Critical Care Procedure Note  Authorized and Performed by: Lizy Matson MD  Total critical care time: 36 minutes  Due to a high probability of clinically significant, life threatening deterioration, the patient required my highest level of preparedness to intervene emergently and I personally spent this critical care time directly and personally managing the patient. This critical care time included obtaining a history; examining the patient; pulse oximetry; ordering and review of studies; arranging urgent treatment with development of a management plan; evaluation of patient's response to treatment; frequent reassessment; and, discussions with other providers.  This critical care time was performed to assess and manage the high probability of imminent, life-threatening deterioration that could result in multi-organ failure. It was exclusive of separately billable procedures and treating other patients and teaching time.  Please see MDM section and the rest of the note for further information on patient assessment and treatment.       Lizy Matson MD  07/17/24 2876    
Transfer of patient care report given to Santosh SONG at bedside.  
meds.  Orders placed [CM]      ED Course User Index  [CM] Lenny Sandoval III, MD  [NG] Arely Mendiola PA-C        Transfer NOTE:  6:01 PM     Patient care transferred to Dr. Matson in stable condition with disposition pending placement.    Discussed available diagnostic results and care plan at length.    MD Jaime Caceres III, Clyde D III, MD  07/21/24 4086    
pending placement.    Discussed available diagnostic results and care plan at length.    MD Jaime Caceres III, Clyde D III, MD  07/20/24 5909

## 2024-07-24 ENCOUNTER — HOSPITAL ENCOUNTER (OUTPATIENT)
Facility: HOSPITAL | Age: 34
Setting detail: SPECIMEN
Discharge: HOME | End: 2024-07-27

## 2024-07-24 LAB — LITHIUM SERPL-SCNC: <0.2 MMOL/L (ref 0.6–1.2)

## 2024-07-24 PROCEDURE — 80178 ASSAY OF LITHIUM: CPT

## 2024-08-19 ENCOUNTER — HOSPITAL ENCOUNTER (EMERGENCY)
Facility: HOSPITAL | Age: 34
Discharge: LEFT BEFORE DISCHARGE | End: 2024-08-19
Attending: EMERGENCY MEDICINE

## 2024-08-19 DIAGNOSIS — F23 ACUTE PSYCHOSIS (HCC): Primary | ICD-10-CM

## 2024-08-19 DIAGNOSIS — F31.9 BIPOLAR 1 DISORDER (HCC): ICD-10-CM

## 2024-08-19 PROCEDURE — 99283 EMERGENCY DEPT VISIT LOW MDM: CPT

## 2024-08-20 NOTE — ED PROVIDER NOTES
EMERGENCY DEPARTMENT HISTORY AND PHYSICAL EXAM      Patient Name: Myla Ta  MRN: [id number]  YOB: 1990  Provider: Lizy Matson MD  PCP: Debbie Puente MD   Time/Date of evaluation: 8:59 PM EDT on 8/19/24    History of Presenting Illness     No chief complaint on file.      History Provided By: EMS and Patient     History (Narative):   Myla Ta is a 33 y.o. female with a PMHX of bipolar disorder  who presents to the emergency department  by EMS C/O acute psychosis.  The patient was found walking around.  She is not cooperative.  She is yelling out various statements and obscenities.    The patient is well-known to me from her prior ED visit for similar symptoms.  She eventually was involuntarily committed.    The patient is not currently under an ECO or a TDO.        Past History     Past Medical History:  Past Medical History:   Diagnosis Date    Heart murmur     Recurrent UTI        Past Surgical History:  Past Surgical History:   Procedure Laterality Date    WISDOM TOOTH EXTRACTION         Family History:  No family history on file.    Social History:  Social History     Tobacco Use    Smoking status: Every Day     Current packs/day: 1.00     Types: Cigarettes    Smokeless tobacco: Never   Substance Use Topics    Alcohol use: Yes    Drug use: Not Currently     Types: Marijuana (Weed)       Medications:  Current Facility-Administered Medications   Medication Dose Route Frequency Provider Last Rate Last Admin    ziprasidone (GEODON) 20 mg in sterile water 1 mL injection  20 mg IntraMUSCular Once Lizy Matson MD         Current Outpatient Medications   Medication Sig Dispense Refill    ibuprofen (ADVIL;MOTRIN) 600 MG tablet Take 600 mg by mouth every 6 hours as needed         Allergies:  No Known Allergies    Social Determinants of Health:  Social Determinants of Health     Tobacco Use: High Risk (12/9/2022)    Patient History     Smoking Tobacco Use: Every Day     Smokeless Tobacco

## 2024-08-20 NOTE — VIRTUAL HEALTH
Reason for Cancel: TelePsych Reason for Cancel: Patient eloped  Writer was about to evaluate and note written by Roopa SOGN @ 2113 that pt eloped     Myla Ta, was evaluated through a synchronous (real-time) audio-video encounter. The patient (and/or guardian if applicable) is aware that this is a billable service, which includes applicable co-pays. This virtual visit was conducted with patient's (and/or legal guardian's) consent. Patient identification was verified, and a caregiver was present when appropriate.  The patient was located at Facility (Appt Department): [facility] EMERGENCY DEPT  [address] [address]  Loc: [phone]  The provider was located at Home (City/State): [place], [place]   Confirm you are appropriately licensed, registered, or certified to deliver care in the state where the patient is located as indicated above. If you are not or unsure, please re-schedule the visit: Yes, I confirm.   [place] Consult to Tele-Psych  Consult performed by: Lizy Matson MD  Consult ordered by: Lizy Matson MD         Total time spent on this encounter: Not billed by time    --Lucy Roberts LCSW on 8/19/2024 at 9:18 PM    An electronic signature was used to authenticate this note.

## 2024-08-29 ENCOUNTER — HOSPITAL ENCOUNTER (EMERGENCY)
Facility: HOSPITAL | Age: 34
Discharge: PSYCHIATRIC OR SUBSTANCE USE FACILITY | End: 2024-08-30
Attending: EMERGENCY MEDICINE
Payer: MEDICARE

## 2024-08-29 DIAGNOSIS — F23 ACUTE PSYCHOSIS (HCC): ICD-10-CM

## 2024-08-29 DIAGNOSIS — F31.9 BIPOLAR 1 DISORDER (HCC): ICD-10-CM

## 2024-08-29 DIAGNOSIS — F25.9 SCHIZOAFFECTIVE DISORDER, UNSPECIFIED TYPE (HCC): Primary | ICD-10-CM

## 2024-08-29 LAB
ALBUMIN SERPL-MCNC: 3.6 G/DL (ref 3.4–5)
ALBUMIN/GLOB SERPL: 1 (ref 0.8–1.7)
ALP SERPL-CCNC: 96 U/L (ref 45–117)
ALT SERPL-CCNC: 73 U/L (ref 13–56)
AMPHET UR QL SCN: NEGATIVE
ANION GAP SERPL CALC-SCNC: 5 MMOL/L (ref 3–18)
AST SERPL-CCNC: 80 U/L (ref 10–38)
BARBITURATES UR QL SCN: NEGATIVE
BASOPHILS # BLD: 0.1 K/UL (ref 0–0.1)
BASOPHILS NFR BLD: 1 % (ref 0–2)
BENZODIAZ UR QL: NEGATIVE
BILIRUB SERPL-MCNC: 0.2 MG/DL (ref 0.2–1)
BUN SERPL-MCNC: 6 MG/DL (ref 7–18)
BUN/CREAT SERPL: 7 (ref 12–20)
CALCIUM SERPL-MCNC: 9.2 MG/DL (ref 8.5–10.1)
CANNABINOIDS UR QL SCN: POSITIVE
CHLORIDE SERPL-SCNC: 107 MMOL/L (ref 100–111)
CO2 SERPL-SCNC: 26 MMOL/L (ref 21–32)
COCAINE UR QL SCN: POSITIVE
CREAT SERPL-MCNC: 0.81 MG/DL (ref 0.6–1.3)
DIFFERENTIAL METHOD BLD: ABNORMAL
EOSINOPHIL # BLD: 0.7 K/UL (ref 0–0.4)
EOSINOPHIL NFR BLD: 6 % (ref 0–5)
ERYTHROCYTE [DISTWIDTH] IN BLOOD BY AUTOMATED COUNT: 15.5 % (ref 11.6–14.5)
ETHANOL SERPL-MCNC: <3 MG/DL (ref 0–3)
FLUAV RNA SPEC QL NAA+PROBE: NOT DETECTED
FLUBV RNA SPEC QL NAA+PROBE: NOT DETECTED
GLOBULIN SER CALC-MCNC: 3.7 G/DL (ref 2–4)
GLUCOSE SERPL-MCNC: 96 MG/DL (ref 74–99)
HCG SERPL QL: NEGATIVE
HCT VFR BLD AUTO: 38 % (ref 35–45)
HGB BLD-MCNC: 11.9 G/DL (ref 12–16)
IMM GRANULOCYTES # BLD AUTO: 0.1 K/UL (ref 0–0.04)
IMM GRANULOCYTES NFR BLD AUTO: 1 % (ref 0–0.5)
LYMPHOCYTES # BLD: 1.9 K/UL (ref 0.9–3.6)
LYMPHOCYTES NFR BLD: 17 % (ref 21–52)
Lab: ABNORMAL
MCH RBC QN AUTO: 27.1 PG (ref 24–34)
MCHC RBC AUTO-ENTMCNC: 31.3 G/DL (ref 31–37)
MCV RBC AUTO: 86.6 FL (ref 78–100)
METHADONE UR QL: NEGATIVE
MONOCYTES # BLD: 1 K/UL (ref 0.05–1.2)
MONOCYTES NFR BLD: 9 % (ref 3–10)
NEUTS SEG # BLD: 7.4 K/UL (ref 1.8–8)
NEUTS SEG NFR BLD: 67 % (ref 40–73)
NRBC # BLD: 0 K/UL (ref 0–0.01)
NRBC BLD-RTO: 0 PER 100 WBC
OPIATES UR QL: NEGATIVE
PCP UR QL: NEGATIVE
PLATELET # BLD AUTO: 440 K/UL (ref 135–420)
PMV BLD AUTO: 8.6 FL (ref 9.2–11.8)
POTASSIUM SERPL-SCNC: 3.6 MMOL/L (ref 3.5–5.5)
PROT SERPL-MCNC: 7.3 G/DL (ref 6.4–8.2)
RBC # BLD AUTO: 4.39 M/UL (ref 4.2–5.3)
SARS-COV-2 RNA RESP QL NAA+PROBE: NOT DETECTED
SODIUM SERPL-SCNC: 138 MMOL/L (ref 136–145)
SOURCE: NORMAL
WBC # BLD AUTO: 11 K/UL (ref 4.6–13.2)

## 2024-08-29 PROCEDURE — 90791 PSYCH DIAGNOSTIC EVALUATION: CPT | Performed by: SOCIAL WORKER

## 2024-08-29 PROCEDURE — 6360000002 HC RX W HCPCS: Performed by: EMERGENCY MEDICINE

## 2024-08-29 PROCEDURE — 84703 CHORIONIC GONADOTROPIN ASSAY: CPT

## 2024-08-29 PROCEDURE — 87636 SARSCOV2 & INF A&B AMP PRB: CPT

## 2024-08-29 PROCEDURE — 96374 THER/PROPH/DIAG INJ IV PUSH: CPT

## 2024-08-29 PROCEDURE — 80053 COMPREHEN METABOLIC PANEL: CPT

## 2024-08-29 PROCEDURE — 99285 EMERGENCY DEPT VISIT HI MDM: CPT

## 2024-08-29 PROCEDURE — 82077 ASSAY SPEC XCP UR&BREATH IA: CPT

## 2024-08-29 PROCEDURE — 96372 THER/PROPH/DIAG INJ SC/IM: CPT

## 2024-08-29 PROCEDURE — 80307 DRUG TEST PRSMV CHEM ANLYZR: CPT

## 2024-08-29 PROCEDURE — 85025 COMPLETE CBC W/AUTO DIFF WBC: CPT

## 2024-08-29 RX ORDER — LORAZEPAM 2 MG/ML
2 INJECTION INTRAMUSCULAR ONCE
Status: COMPLETED | OUTPATIENT
Start: 2024-08-29 | End: 2024-08-29

## 2024-08-29 RX ORDER — DIPHENHYDRAMINE HYDROCHLORIDE 50 MG/ML
50 INJECTION INTRAMUSCULAR; INTRAVENOUS
Status: COMPLETED | OUTPATIENT
Start: 2024-08-29 | End: 2024-08-29

## 2024-08-29 RX ADMIN — LORAZEPAM 2 MG: 2 INJECTION INTRAMUSCULAR; INTRAVENOUS at 20:52

## 2024-08-29 RX ADMIN — DIPHENHYDRAMINE HYDROCHLORIDE 50 MG: 50 INJECTION INTRAMUSCULAR; INTRAVENOUS at 12:55

## 2024-08-29 RX ADMIN — LORAZEPAM 2 MG: 2 INJECTION INTRAMUSCULAR; INTRAVENOUS at 12:56

## 2024-08-29 ASSESSMENT — ENCOUNTER SYMPTOMS
ALLERGIC/IMMUNOLOGIC NEGATIVE: 1
EYES NEGATIVE: 1
RESPIRATORY NEGATIVE: 1
GASTROINTESTINAL NEGATIVE: 1

## 2024-08-29 NOTE — ED PROVIDER NOTES
[facility] EMERGENCY DEPT  EMERGENCY DEPARTMENT ENCOUNTER      Pt Name: Myla Ta  MRN: [id number]  Birthdate 1990  Date of evaluation: 8/29/2024  Provider: JOEL Guan  8:20 PM    CHIEF COMPLAINT       Chief Complaint   Patient presents with    Mental Health Problem         HISTORY OF PRESENT ILLNESS    Myla Ta is a 33 y.o. female who presents to the emergency department with aggressive behavior today.  She storming around walking telling everybody that they should \"get the fuck back up\".  Will not allow tech to get vital signs.  Patient is talking to someone who is not in the room.  Would like a Band-Aid for her right ankle and something to eat.  Unable to get any additional information at this point.  Telepsych order placed.  Patient has a history of bipolar disease.    [facility]    Nursing Notes were reviewed.    REVIEW OF SYSTEMS       Review of Systems   Constitutional: Negative.    HENT: Negative.     Eyes: Negative.    Respiratory: Negative.     Cardiovascular: Negative.    Gastrointestinal: Negative.    Endocrine: Negative.    Genitourinary: Negative.    Musculoskeletal: Negative.  Negative for gait problem.   Skin:  Positive for wound.   Allergic/Immunologic: Negative.    Neurological: Negative.    Hematological: Negative.    Psychiatric/Behavioral:  Positive for agitation, behavioral problems and hallucinations.        Except as noted above the remainder of the review of systems was reviewed and negative.       PAST MEDICAL HISTORY     Past Medical History:   Diagnosis Date    Heart murmur     Recurrent UTI          SURGICAL HISTORY       Past Surgical History:   Procedure Laterality Date    WISDOM TOOTH EXTRACTION           CURRENT MEDICATIONS       Previous Medications    IBUPROFEN (ADVIL;MOTRIN) 600 MG TABLET    Take 600 mg by mouth every 6 hours as needed       ALLERGIES     Patient has no known allergies.    FAMILY HISTORY     History reviewed. No pertinent family history.       SOCIAL

## 2024-08-29 NOTE — ED TRIAGE NOTES
Pt in ED via ems for erratic behavior. Pt was found in a persons yard yelling at the resident on their porch. Pt now dancing, singing, yelling, twerking, stripping, and running around ED. Pt covered in urine and there is a strong odor of urine. Pt also has a fishy odor and is c/o vaginal pain. Took pt to shower first to clean urine off of pt and change clothes. Pt does not appear to be in any distress at this time.

## 2024-08-29 NOTE — BSMART NOTE
Crisis Note: Crisis attempted to see patient; however, patient would not wake up to her name being called several times. Crisis was informed that patient was administrated medications to help her calm down. Crisis will see her at a later time. JOEL Schmitz made aware.

## 2024-08-29 NOTE — VIRTUAL HEALTH
Myla Ta  [id number]  1990     Social Work Behavioral Health Crisis Assessment    08/29/24    Chief Complaint: Mental Health Evaluation    HPI: Patient is a 33 y.o. White (non-)  Unavailable female who presents for Mental Health Evaluation. Patient presented to the ED on 08/29/24 from street.     RN, April reported, \" She arrived, she was on the stretcher dancing and she is over sexual. I made sure she is bathed, and I locked her up stuff. She has a long history, mom committed suicide when she was young.     Pt reported, \"Who has been a child molester. A horseless chamber less, Avelino Askew fired. I want my boyfriend here. I can't smell anything but, oral medication. I don't want to see. Are you safe,stand down for 60 seconds. \"     Pt presented, agitated, pulling at lines, gown, etc., responding to internal stimuli, physically aggressive, and involuntary movements. Pt would move her gown up and down, and grit her teeth while pulling all her gown. Pt would respond to question not asked but return gaze to then look in a different direction. Pt was attentive, guarded, argumentative, and hostile. When asked to answer any questions would attempt to answer and then get visibly upset. Pt's speech was rapid, overabundance of ideas, flight of ideas, illogical, incoherent, and tangential. Pt was unable to keep with questions and would change subjects quickly. ED staff attempted to calm her down several times however pt would walk away from camera while being redirected.     Past Psychiatric History:  Previous Diagnoses/symptoms: Long History of Acute psychosis, Bipolar Affective, Suicidal ideation   Previous suicide attempts/self-harm: History of past attempts  Inpatient psychiatric hospitalizations: yes  Current outpatient psychiatric provider: Denies  Current therapist: Began to yell when asked.     Sleep Hours: Unable to answer    Sleep concerns: denies    Use of sleep medications: denies    Substance

## 2024-08-29 NOTE — BSMART NOTE
Chief Complaint   Patient presents with    Mental Health Problem       Patient was evaluated by Tele-Psych who recommends inpatient psychiatric treatment for auditory hallucinations and psychosis, Taoist preoccupied  oriented to person only. Patient either answered the questions, by asking a question. \"Are you saved\"; or just stare at this writer, with a blank look on her face.     Patient is voluntary for inpatient treatment. Patient appears to lack capacity to consent to treatment.  TDO prescreen requested.    Past Medical History:   Diagnosis Date    Heart murmur     Recurrent UTI        Prior to Visit Medications    Medication Sig Taking? Authorizing Provider   ibuprofen (ADVIL;MOTRIN) 600 MG tablet Take 600 mg by mouth every 6 hours as needed  Automatic Reconciliation, Ar         The following labs and vitals were reviewed with on-call psychiatrist.    BMP and CMP    Vitals:    08/29/24 1300   BP: (!) 151/101   Pulse: (!) 111   Resp: 18   Temp: 98 °F (36.7 °C)   SpO2: 98%         Writer met with patient to assess the following    Ambulation -  Patient unable to answer; but appears to ambulate     ADLs -  Patient did not answer; but asked if I was saved.     DME - Patient requires none.    In consultation with  [facility] provider Saige Donahue . Patient appears to lack capacity to consent to treatment, and would be disruptive to the milieu at [facility], recommend TDO pre-screen.        Marianne Garza LPC, CSAC, CADC  [employer] Counselor

## 2024-08-30 VITALS
OXYGEN SATURATION: 98 % | RESPIRATION RATE: 20 BRPM | TEMPERATURE: 97.1 F | DIASTOLIC BLOOD PRESSURE: 71 MMHG | SYSTOLIC BLOOD PRESSURE: 113 MMHG | HEART RATE: 97 BPM

## 2024-08-30 PROCEDURE — 6360000002 HC RX W HCPCS: Performed by: STUDENT IN AN ORGANIZED HEALTH CARE EDUCATION/TRAINING PROGRAM

## 2024-08-30 PROCEDURE — 96375 TX/PRO/DX INJ NEW DRUG ADDON: CPT

## 2024-08-30 PROCEDURE — 2580000003 HC RX 258: Performed by: EMERGENCY MEDICINE

## 2024-08-30 PROCEDURE — 96372 THER/PROPH/DIAG INJ SC/IM: CPT

## 2024-08-30 PROCEDURE — 6360000002 HC RX W HCPCS: Performed by: EMERGENCY MEDICINE

## 2024-08-30 PROCEDURE — 6370000000 HC RX 637 (ALT 250 FOR IP): Performed by: STUDENT IN AN ORGANIZED HEALTH CARE EDUCATION/TRAINING PROGRAM

## 2024-08-30 RX ORDER — NICOTINE 21 MG/24HR
1 PATCH, TRANSDERMAL 24 HOURS TRANSDERMAL DAILY
Status: DISCONTINUED | OUTPATIENT
Start: 2024-08-30 | End: 2024-08-30 | Stop reason: HOSPADM

## 2024-08-30 RX ORDER — IBUPROFEN 600 MG/1
600 TABLET, FILM COATED ORAL
Status: DISCONTINUED | OUTPATIENT
Start: 2024-08-30 | End: 2024-08-30 | Stop reason: HOSPADM

## 2024-08-30 RX ORDER — LORAZEPAM 2 MG/ML
2 INJECTION INTRAMUSCULAR ONCE
Status: COMPLETED | OUTPATIENT
Start: 2024-08-30 | End: 2024-08-30

## 2024-08-30 RX ORDER — DIPHENHYDRAMINE HYDROCHLORIDE 50 MG/ML
50 INJECTION INTRAMUSCULAR; INTRAVENOUS ONCE
Status: COMPLETED | OUTPATIENT
Start: 2024-08-30 | End: 2024-08-30

## 2024-08-30 RX ORDER — DIPHENHYDRAMINE HYDROCHLORIDE 50 MG/ML
50 INJECTION INTRAMUSCULAR; INTRAVENOUS ONCE
Status: DISCONTINUED | OUTPATIENT
Start: 2024-08-30 | End: 2024-08-30

## 2024-08-30 RX ORDER — DIPHENHYDRAMINE HYDROCHLORIDE 50 MG/ML
50 INJECTION INTRAMUSCULAR; INTRAVENOUS
Status: COMPLETED | OUTPATIENT
Start: 2024-08-30 | End: 2024-08-30

## 2024-08-30 RX ADMIN — DIPHENHYDRAMINE HYDROCHLORIDE 50 MG: 50 INJECTION INTRAMUSCULAR; INTRAVENOUS at 08:30

## 2024-08-30 RX ADMIN — LORAZEPAM 2 MG: 2 INJECTION INTRAMUSCULAR; INTRAVENOUS at 08:30

## 2024-08-30 RX ADMIN — DIPHENHYDRAMINE HYDROCHLORIDE 50 MG: 50 INJECTION INTRAMUSCULAR; INTRAVENOUS at 02:54

## 2024-08-30 RX ADMIN — WATER 10 MG: 1 INJECTION INTRAMUSCULAR; INTRAVENOUS; SUBCUTANEOUS at 02:55

## 2024-08-30 ASSESSMENT — PAIN SCALES - GENERAL: PAINLEVEL_OUTOF10: 0

## 2024-08-30 NOTE — BSMART NOTE
CRISIS NOTE: Per Tang, [facility] Pt still under review for [facility]        Marianne Garza, LPC, CSAC, CADC  [employer] Counselor

## 2024-08-30 NOTE — ED NOTES
[employer] transferring pt to [facility]. All pt belongings sent with patient. EMTALA paperwork signed, copied and placed in officer's hand.   Pt taken in police custody, ambulatory and in no acute distress on transfer. .

## 2024-08-30 NOTE — ED NOTES
Assumed care of patient. Patient laying in bed, eyes closed, laying left side. Wearing blue scrubs, red socks. [place] laying off to the side. Patient is quiet, calm. No respiratory distress observed.

## 2024-08-30 NOTE — ED NOTES
Transport service arrived with TDO for patient.  Per transport service the patient has a [place] address and they should be doing the transport.  Also the patient is too agitated her their transport.  Per transport service staff they already contacted [place] for transport.

## 2024-08-30 NOTE — ED NOTES
Patient seen and evaluated by [facility] crisis worker and it has been determined that she needs to be held under a TDO.  Worker will reach out to [facility] worker.

## 2024-08-30 NOTE — ED NOTES
I received the patient in turnover from JOEL Petersen at the end of her shift.  The patient is a 33-year-old woman who is well-known to me.  She has a history of bipolar disorder.  She is acutely psychotic.  She is requesting that we give her a \"shot\" to calm her down.  She states that she is trying to cooperate.  I ordered Ativan and Benadryl.    She is awaiting evaluation through crisis at this time.     Lizy Matson MD  08/31/24 0510

## 2024-08-30 NOTE — BSMART NOTE
CRISIS NOTE: TDO petition faxed to [facility]        Marianne Garza, LAXMI, CSAC, CADC  [employer] Counselor

## 2024-08-30 NOTE — ED NOTES
Patient has bed placement at [facility] on [address]. Accepting Demarco Glass MD. Report needs to be called to [phone].     [place] [employer] will transport patient between 0700 to 0900 on 8/30/2024.

## 2024-08-30 NOTE — ED NOTES
Patient in room yelling she is in pain and needs pain medication right now. Asked MD for ibuprofen or Tylenol per patient request . Offered patient ibuprofen, patient would not respond to this nurses offer when medication brought to bedside. Will hold medication.

## 2024-08-30 NOTE — ED NOTES
Patient laying in bed, laying right side, eyes opening and closing. No respiratory distress observed. Slow easy breathing observed.

## 2024-08-30 NOTE — BSMART NOTE
CRISIS NOTE: [facility] attempted to assess Patient but unable to wake her.         Marianne Garza, LAXMI, CSAC, CADC  [employer] Counselor

## 2024-08-30 NOTE — ED NOTES
Report provided by NOHEMI Lowe    Pt standing in room, yelling (flight of thoughts), sitter at doorway

## 2024-08-30 NOTE — ED NOTES
The patient has been accepted at [facility].  Dr. Demarco Sofia is the accepting physician.  The EMTALA form has been completed.  She is due to the part between 7 and 9 AM.     Lizy Matson MD  08/30/24 0578

## 2024-10-22 ENCOUNTER — HOSPITAL ENCOUNTER (EMERGENCY)
Facility: HOSPITAL | Age: 34
Discharge: LEFT BEFORE DISCHARGE | End: 2024-10-23
Payer: MEDICARE

## 2024-10-22 VITALS
DIASTOLIC BLOOD PRESSURE: 58 MMHG | OXYGEN SATURATION: 98 % | HEART RATE: 98 BPM | SYSTOLIC BLOOD PRESSURE: 100 MMHG | RESPIRATION RATE: 20 BRPM | WEIGHT: 135 LBS | HEIGHT: 64 IN | TEMPERATURE: 99.4 F | BODY MASS INDEX: 23.05 KG/M2

## 2024-10-22 DIAGNOSIS — Z53.21 ELOPED FROM EMERGENCY DEPARTMENT: ICD-10-CM

## 2024-10-22 DIAGNOSIS — F23 ACUTE PSYCHOSIS (HCC): Primary | ICD-10-CM

## 2024-10-22 DIAGNOSIS — F14.10 COCAINE ABUSE (HCC): ICD-10-CM

## 2024-10-22 DIAGNOSIS — F31.9 BIPOLAR 1 DISORDER (HCC): ICD-10-CM

## 2024-10-22 LAB
AMPHET UR QL SCN: NEGATIVE
APPEARANCE UR: CLEAR
BACTERIA URNS QL MICRO: ABNORMAL /HPF
BARBITURATES UR QL SCN: NEGATIVE
BENZODIAZ UR QL: NEGATIVE
BILIRUB UR QL: NEGATIVE
CANNABINOIDS UR QL SCN: POSITIVE
COCAINE UR QL SCN: POSITIVE
COLOR UR: YELLOW
EPITH CASTS URNS QL MICRO: ABNORMAL /LPF (ref 0–5)
GLUCOSE UR STRIP.AUTO-MCNC: NEGATIVE MG/DL
HGB UR QL STRIP: NEGATIVE
KETONES UR QL STRIP.AUTO: NEGATIVE MG/DL
LEUKOCYTE ESTERASE UR QL STRIP.AUTO: ABNORMAL
Lab: ABNORMAL
METHADONE UR QL: NEGATIVE
MUCOUS THREADS URNS QL MICRO: ABNORMAL /LPF
NITRITE UR QL STRIP.AUTO: POSITIVE
OPIATES UR QL: NEGATIVE
PCP UR QL: NEGATIVE
PH UR STRIP: 5.5 (ref 5–8)
PROT UR STRIP-MCNC: NEGATIVE MG/DL
RBC #/AREA URNS HPF: ABNORMAL /HPF (ref 0–5)
SP GR UR REFRACTOMETRY: 1.01 (ref 1–1.03)
UROBILINOGEN UR QL STRIP.AUTO: 0.2 EU/DL (ref 0.2–1)
WBC URNS QL MICRO: ABNORMAL /HPF (ref 0–4)

## 2024-10-22 PROCEDURE — 87491 CHLMYD TRACH DNA AMP PROBE: CPT

## 2024-10-22 PROCEDURE — 6360000002 HC RX W HCPCS: Performed by: EMERGENCY MEDICINE

## 2024-10-22 PROCEDURE — 80307 DRUG TEST PRSMV CHEM ANLYZR: CPT

## 2024-10-22 PROCEDURE — 2580000003 HC RX 258: Performed by: EMERGENCY MEDICINE

## 2024-10-22 PROCEDURE — 87661 TRICHOMONAS VAGINALIS AMPLIF: CPT

## 2024-10-22 PROCEDURE — 87591 N.GONORRHOEAE DNA AMP PROB: CPT

## 2024-10-22 PROCEDURE — 81001 URINALYSIS AUTO W/SCOPE: CPT

## 2024-10-22 PROCEDURE — 99284 EMERGENCY DEPT VISIT MOD MDM: CPT

## 2024-10-22 PROCEDURE — 96372 THER/PROPH/DIAG INJ SC/IM: CPT

## 2024-10-22 RX ORDER — DIPHENHYDRAMINE HYDROCHLORIDE 50 MG/ML
25 INJECTION INTRAMUSCULAR; INTRAVENOUS
Status: DISPENSED | OUTPATIENT
Start: 2024-10-22 | End: 2024-10-23

## 2024-10-22 RX ORDER — LORAZEPAM 2 MG/ML
0.5 INJECTION INTRAMUSCULAR ONCE
Status: DISCONTINUED | OUTPATIENT
Start: 2024-10-22 | End: 2024-10-23 | Stop reason: HOSPADM

## 2024-10-22 RX ADMIN — ZIPRASIDONE MESYLATE 20 MG: 20 INJECTION, POWDER, LYOPHILIZED, FOR SOLUTION INTRAMUSCULAR at 11:55

## 2024-10-22 ASSESSMENT — ENCOUNTER SYMPTOMS
EYES NEGATIVE: 1
ALLERGIC/IMMUNOLOGIC NEGATIVE: 1
RESPIRATORY NEGATIVE: 1
GASTROINTESTINAL NEGATIVE: 1

## 2024-10-22 ASSESSMENT — PAIN - FUNCTIONAL ASSESSMENT: PAIN_FUNCTIONAL_ASSESSMENT: NONE - DENIES PAIN

## 2024-10-22 NOTE — VIRTUAL HEALTH
[place] Consult to Tele-Psych  Consult performed by: Alice Contreras APRN - CNP  Consult ordered by: Ainsley Renteria PA  Reason for consult: Psychosis    Myla Ta  [id number]  1990     EMERGENCY DEPARTMENT TELEPSYCHIATRY EVALUATION    10/22/24    Chief Complaint:  “Who are you. Who do you work for. I don't want to be recorded.”    HPI: Patient is a 34 y.o.  female who presents for psychosis. Patient presented to the ED on 10/22/24 via ambulance. History from the ED: \"female who presents to the emergency department with acute psychosis.  Patient smoked crack cocaine yesterday and today she went up to the firefighters station asking for help.  CRT came and took about 2 hours to convince her to come in for evaluation.  Patient has flight of ideas pressured speech smiling then crying.  Has 3 children none of whom live with her.\" Upon assessment today patient is agitated and pacing in room. Patient presents with labile mood, paranoia, pressured speech, yelling, cursing, and displays flight of ideas. Asked patient reason for presenting to the ED and patient states \"I was tricked.\" Patient then begins to yell and walk in and out of the camera, stating, \"Who do you work for? Is it red, white and blue or red, white, and who? I don't want to be recorded. You will be fired for this.\" Staff at bedside attempting to deescalate patient. Patient is unable to cooperative with rest of assessment at this time and states, \"I don't have time for these questions\" and pushed camera out of room. Due to this history is obtained by chart review. UDS and ETOH awaiting collection at this time.       Past Psychiatric History:  Previous Diagnoses/symptoms: Psychosis, Bipolar 1, substance use  Previous suicide attempts/self-harm: yes  Inpatient psychiatric hospitalizations: yes  Current outpatient psychiatric provider: CHEYENNE  Current therapist: CHEYENNE  Previous psychiatric medication trials: CHEYENNE  Current psychiatric

## 2024-10-22 NOTE — ED NOTES
RN able to obtain VS. Pt becoming increasingly agitated during procedure. Provided pt with 2 blankets as requested.

## 2024-10-22 NOTE — BSMART NOTE
Chief Complaint   Patient presents with    Addiction Problem    Psychiatric Evaluation       Patient was evaluated by Tele-Psych who recommends inpatient psychiatric treatment for  brice and psychosis    Patient is not voluntary inpatient treatment.    Past Medical History:   Diagnosis Date    Heart murmur     Recurrent UTI        Prior to Visit Medications    Medication Sig Taking? Authorizing Provider   ibuprofen (ADVIL;MOTRIN) 600 MG tablet Take 600 mg by mouth every 6 hours as needed  Automatic Reconciliation, Ar         The following labs and vitals were reviewed with on-call psychiatrist.    Patient is refusing labs and vital signs.    There were no vitals filed for this visit.      Writer met with patient to assess the following    Ambulation -  patient is ambulating about the ER without difficulty.    ADLs - Patient able to perform own ADLs without assistance.    DME - Patient requires none.    In consultation with on call provider Linus CARO. Patient has been  referred to the [place] [facility] for an TDO evaluation. .

## 2024-10-22 NOTE — ED NOTES
Bedside and Verbal shift change report given to NOHEMI Chandler (oncoming nurse) by NOHEMI Yu (offgoing nurse). Report included the following information Nurse Handoff Report, ED Encounter Summary, Adult Overview, and MAR.

## 2024-10-22 NOTE — ED NOTES
5:28 PM Assumed care of the patient at this time. Discussed with JOEL Renteria concerning patient Myla Ta, standard discussion of reason for visit, HPI, ROS, PE, and current results available.  Recommendation for obtaining pending CSB evaluation and recommendation to disposition the patient.  Patient is pending CSB evaluation for possible TDO for acute psychosis.  The patient was given a dose of Geodon and was too sedated to complete the initial evaluation by CSB.  Will continue to monitor.    Regina Workman PA-C      11:33 PM patient was pending completion of her medical screening examination as well as CSB evaluation however she has eloped from the ED. [facility] made aware. Regina Workman PA-C     Disposition: d/c    Dictation disclaimer:  Please note that this dictation was completed with Engagor, the computer voice recognition software.  Quite often unanticipated grammatical, syntax, homophones, and other interpretive errors are inadvertently transcribed by the computer software.  Please disregard these errors.  Please excuse any errors that have escaped final proofreading.       Regina Workman PA-C  10/22/24 1543

## 2024-10-22 NOTE — ED TRIAGE NOTES
Pt presents to ED via EMS for drug abuse and psychosis. Pt delusional and uncooperative. States she is on hospice. Pt is labile and irritable.

## 2024-10-22 NOTE — BSMART NOTE
Crisis note:    Called the [facility] answering service, spoke to Kaley to request a call from the on call clinician to request a TDO evaluation on this patient.    1255: Arely of [facility] responded to page and has been informed of need for patient to be evaluated for a TDO.

## 2024-10-22 NOTE — PROGRESS NOTES
Pt is in and out of waking up, when pt does awaken fully she starts yelling out.   Pt asked this staff \"what are we waiting on?\" This staff asked \"are you talking about psych?  Pt angrily states \"NO!\" Staff asked \"what are you talking about then?, what are you waiting on?\" Pt said getting more angry \"I want to leave\", then pt went back to sleep.

## 2024-10-22 NOTE — ED PROVIDER NOTES
[facility] EMERGENCY DEPT  EMERGENCY DEPARTMENT ENCOUNTER      Pt Name: Myla Ta  MRN: [id number]  Birthdate 1990  Date of evaluation: 10/22/2024  Provider: JOEL Guan  4:16 PM    CHIEF COMPLAINT       Chief Complaint   Patient presents with    Addiction Problem    Psychiatric Evaluation         HISTORY OF PRESENT ILLNESS    Myla Ta is a 34 y.o. female who presents to the emergency department with acute psychosis.  Patient smoked crack cocaine yesterday and today she went up to the firefighters station asking for help.  CRT came and took about 2 hours to convince her to come in for evaluation.  Patient has flight of ideas pressured speech smiling then crying.  Has 3 children none of whom live with her.  CRT told me that she has been talking of suicide but made no focused plans.      She is jumping up and down screaming lying on the floor doing push-ups.  Then she is pacing the floor doing tippy toe dancing.  She insisted on a diet Coke Dr. Pepper being fed to her through a straw by ED tech.  She is then yelling at police officers and security.    HPI    Nursing Notes were reviewed.    REVIEW OF SYSTEMS       Review of Systems   Constitutional: Negative.    HENT: Negative.     Eyes: Negative.    Respiratory: Negative.     Cardiovascular: Negative.    Gastrointestinal: Negative.    Endocrine: Negative.    Genitourinary: Negative.    Musculoskeletal: Negative.    Skin: Negative.    Allergic/Immunologic: Negative.    Neurological: Negative.    Hematological: Negative.    Psychiatric/Behavioral:  Positive for agitation, behavioral problems and confusion.        Except as noted above the remainder of the review of systems was reviewed and negative.       PAST MEDICAL HISTORY     Past Medical History:   Diagnosis Date    Heart murmur     Recurrent UTI          SURGICAL HISTORY       Past Surgical History:   Procedure Laterality Date    WISDOM TOOTH EXTRACTION           CURRENT MEDICATIONS       Previous

## 2024-10-22 NOTE — BSMART NOTE
Crisis note:    Arely from [facility] came to evaluate patient for a TDO. Patient was sleeping soundly. Had been medicated with IM Geodon 20 mg IM at 1155. ED asked that patient not be awakened. Arely asked to pass on to her relief to follow up on patient evaluation for a TDO.

## 2024-10-23 LAB
C TRACH RRNA SPEC QL NAA+PROBE: NEGATIVE
N GONORRHOEA RRNA SPEC QL NAA+PROBE: NEGATIVE
SPECIMEN SOURCE: NORMAL
T VAGINALIS RRNA SPEC QL NAA+PROBE: NEGATIVE

## 2024-10-23 NOTE — BSMART NOTE
Crisis Note: Spoke with Sarah, [facility], [phone], regarding TDO evaluation on patient. Sarah verbalized that she attempted TDO evaluation, but patient was sedated. Sarah also stated that someone ([facility]), on the next shift, will evaluate patient. Crisis will assist as needed.

## 2024-10-23 NOTE — ED NOTES
Pt became increasingly angry about being here. Pt stated, \"Why the fuck am I still here. What am I waiting for?\" This RN explained to pt that we were waiting for CSB to come and reevaluate her. Pt stated, \"Why the fuck am I waiting for mental health? I am not here for mental health. I am here for an anal problem\" This RN asked pt to please explain what anal problem she was having. Pt stated, \"I had an anal injury 2 years ago and now it is bleeding. Unless it could be Hawaiian Fruit punch. Could it?\" Pt asked to please remain in room. Pt took her things from her room and stated, \"Thank you. I love you but I am leaving.\"    Crisis and provider updated and aware.

## 2024-10-23 NOTE — BSMART NOTE
Crisis Note: Per ED-RN, patient walked out of the emergency room. Earlier on the day shift, patient referred for a TDO evaluation; however, patient eloped before evaluation for TDO. Call placed to [facility], [phone], re: patient has eloped from the emergency room; will assist as needed.    10/22/24 @ 11:28 pm  ALYX Beckwith, [phone], informed that patient has eloped from [facility]-ED.

## 2024-10-23 NOTE — ED NOTES
Spoke with [place] non-emergency to inform them that pt eloped while awaiting evaluation for ECO/TDO.

## 2024-10-31 ENCOUNTER — HOSPITAL ENCOUNTER (EMERGENCY)
Facility: HOSPITAL | Age: 34
Discharge: PSYCHIATRIC OR SUBSTANCE USE FACILITY | End: 2024-11-01
Attending: EMERGENCY MEDICINE
Payer: MEDICARE

## 2024-10-31 DIAGNOSIS — F99 PSYCHIATRIC ILLNESS: Primary | ICD-10-CM

## 2024-10-31 LAB
ALBUMIN SERPL-MCNC: 3.4 G/DL (ref 3.4–5)
ALBUMIN/GLOB SERPL: 0.8 (ref 0.8–1.7)
ALP SERPL-CCNC: 81 U/L (ref 45–117)
ALT SERPL-CCNC: 42 U/L (ref 13–56)
AMPHET UR QL SCN: POSITIVE
ANION GAP SERPL CALC-SCNC: 3 MMOL/L (ref 3–18)
AST SERPL-CCNC: 76 U/L (ref 10–38)
BARBITURATES UR QL SCN: NEGATIVE
BASOPHILS # BLD: 0.1 K/UL (ref 0–0.1)
BASOPHILS NFR BLD: 1 % (ref 0–2)
BENZODIAZ UR QL: POSITIVE
BILIRUB SERPL-MCNC: 0.4 MG/DL (ref 0.2–1)
BUN SERPL-MCNC: 7 MG/DL (ref 7–18)
BUN/CREAT SERPL: 10 (ref 12–20)
CALCIUM SERPL-MCNC: 9.1 MG/DL (ref 8.5–10.1)
CANNABINOIDS UR QL SCN: POSITIVE
CHLORIDE SERPL-SCNC: 109 MMOL/L (ref 100–111)
CO2 SERPL-SCNC: 20 MMOL/L (ref 21–32)
COCAINE UR QL SCN: POSITIVE
CREAT SERPL-MCNC: 0.68 MG/DL (ref 0.6–1.3)
DIFFERENTIAL METHOD BLD: ABNORMAL
EOSINOPHIL # BLD: 0.1 K/UL (ref 0–0.4)
EOSINOPHIL NFR BLD: 2 % (ref 0–5)
ERYTHROCYTE [DISTWIDTH] IN BLOOD BY AUTOMATED COUNT: 14.8 % (ref 11.6–14.5)
ETHANOL SERPL-MCNC: <3 MG/DL (ref 0–3)
GLOBULIN SER CALC-MCNC: 4.1 G/DL (ref 2–4)
GLUCOSE SERPL-MCNC: 94 MG/DL (ref 74–99)
HCG UR QL: NEGATIVE
HCT VFR BLD AUTO: 36.9 % (ref 35–45)
HGB BLD-MCNC: 11.9 G/DL (ref 12–16)
IMM GRANULOCYTES # BLD AUTO: 0.1 K/UL (ref 0–0.04)
IMM GRANULOCYTES NFR BLD AUTO: 1 % (ref 0–0.5)
LYMPHOCYTES # BLD: 2.2 K/UL (ref 0.9–3.6)
LYMPHOCYTES NFR BLD: 24 % (ref 21–52)
Lab: ABNORMAL
MCH RBC QN AUTO: 27.7 PG (ref 24–34)
MCHC RBC AUTO-ENTMCNC: 32.2 G/DL (ref 31–37)
MCV RBC AUTO: 86 FL (ref 78–100)
METHADONE UR QL: NEGATIVE
MONOCYTES # BLD: 1.1 K/UL (ref 0.05–1.2)
MONOCYTES NFR BLD: 12 % (ref 3–10)
NEUTS SEG # BLD: 5.7 K/UL (ref 1.8–8)
NEUTS SEG NFR BLD: 62 % (ref 40–73)
NRBC # BLD: 0 K/UL (ref 0–0.01)
NRBC BLD-RTO: 0 PER 100 WBC
OPIATES UR QL: NEGATIVE
PCP UR QL: NEGATIVE
PLATELET # BLD AUTO: 441 K/UL (ref 135–420)
PMV BLD AUTO: 8.7 FL (ref 9.2–11.8)
POTASSIUM SERPL-SCNC: 5.5 MMOL/L (ref 3.5–5.5)
PROT SERPL-MCNC: 7.5 G/DL (ref 6.4–8.2)
RBC # BLD AUTO: 4.29 M/UL (ref 4.2–5.3)
SODIUM SERPL-SCNC: 132 MMOL/L (ref 136–145)
WBC # BLD AUTO: 9.2 K/UL (ref 4.6–13.2)

## 2024-10-31 PROCEDURE — 6360000002 HC RX W HCPCS: Performed by: EMERGENCY MEDICINE

## 2024-10-31 PROCEDURE — 2580000003 HC RX 258: Performed by: STUDENT IN AN ORGANIZED HEALTH CARE EDUCATION/TRAINING PROGRAM

## 2024-10-31 PROCEDURE — 94761 N-INVAS EAR/PLS OXIMETRY MLT: CPT

## 2024-10-31 PROCEDURE — 81025 URINE PREGNANCY TEST: CPT

## 2024-10-31 PROCEDURE — 82077 ASSAY SPEC XCP UR&BREATH IA: CPT

## 2024-10-31 PROCEDURE — 80307 DRUG TEST PRSMV CHEM ANLYZR: CPT

## 2024-10-31 PROCEDURE — 96372 THER/PROPH/DIAG INJ SC/IM: CPT

## 2024-10-31 PROCEDURE — 80053 COMPREHEN METABOLIC PANEL: CPT

## 2024-10-31 PROCEDURE — 85025 COMPLETE CBC W/AUTO DIFF WBC: CPT

## 2024-10-31 PROCEDURE — 99285 EMERGENCY DEPT VISIT HI MDM: CPT

## 2024-10-31 PROCEDURE — 6360000002 HC RX W HCPCS: Performed by: STUDENT IN AN ORGANIZED HEALTH CARE EDUCATION/TRAINING PROGRAM

## 2024-10-31 RX ORDER — MIDAZOLAM HYDROCHLORIDE 1 MG/ML
4 INJECTION, SOLUTION INTRAMUSCULAR; INTRAVENOUS
Status: COMPLETED | OUTPATIENT
Start: 2024-10-31 | End: 2024-10-31

## 2024-10-31 RX ORDER — DIPHENHYDRAMINE HYDROCHLORIDE 50 MG/ML
50 INJECTION INTRAMUSCULAR; INTRAVENOUS
Status: COMPLETED | OUTPATIENT
Start: 2024-10-31 | End: 2024-10-31

## 2024-10-31 RX ORDER — HALOPERIDOL 5 MG/ML
5 INJECTION INTRAMUSCULAR
Status: COMPLETED | OUTPATIENT
Start: 2024-10-31 | End: 2024-10-31

## 2024-10-31 RX ORDER — LORAZEPAM 2 MG/ML
2 INJECTION INTRAMUSCULAR
Status: COMPLETED | OUTPATIENT
Start: 2024-10-31 | End: 2024-10-31

## 2024-10-31 RX ORDER — DIPHENHYDRAMINE HYDROCHLORIDE 50 MG/ML
50 INJECTION INTRAMUSCULAR; INTRAVENOUS
Status: DISCONTINUED | OUTPATIENT
Start: 2024-10-31 | End: 2024-10-31

## 2024-10-31 RX ORDER — HALOPERIDOL 5 MG/ML
5 INJECTION INTRAMUSCULAR ONCE
Status: COMPLETED | OUTPATIENT
Start: 2024-10-31 | End: 2024-10-31

## 2024-10-31 RX ORDER — MIDAZOLAM HYDROCHLORIDE 1 MG/ML
4 INJECTION, SOLUTION INTRAMUSCULAR; INTRAVENOUS
Status: DISCONTINUED | OUTPATIENT
Start: 2024-10-31 | End: 2024-10-31

## 2024-10-31 RX ADMIN — LORAZEPAM 2 MG: 2 INJECTION INTRAMUSCULAR; INTRAVENOUS at 14:51

## 2024-10-31 RX ADMIN — MIDAZOLAM 4 MG: 1 INJECTION INTRAMUSCULAR; INTRAVENOUS at 05:13

## 2024-10-31 RX ADMIN — DIPHENHYDRAMINE HYDROCHLORIDE 50 MG: 50 INJECTION INTRAMUSCULAR; INTRAVENOUS at 14:50

## 2024-10-31 RX ADMIN — HALOPERIDOL LACTATE 5 MG: 5 INJECTION, SOLUTION INTRAMUSCULAR at 14:50

## 2024-10-31 RX ADMIN — WATER 7.5 MG: 1 INJECTION INTRAMUSCULAR; INTRAVENOUS; SUBCUTANEOUS at 08:10

## 2024-10-31 RX ADMIN — HALOPERIDOL LACTATE 5 MG: 5 INJECTION, SOLUTION INTRAMUSCULAR at 05:12

## 2024-10-31 NOTE — ED PROVIDER NOTES
[facility] EMERGENCY DEPT  EMERGENCY DEPARTMENT ENCOUNTER      Pt Name: Myla Ta  MRN: [id number]  Birthdate 1990  Date of evaluation: 10/31/2024  Provider: MACKENZIE LOMBARDO MD  2:56 PM    CHIEF COMPLAINT       Chief Complaint   Patient presents with    Mental Health Problem         HISTORY OF PRESENT ILLNESS    Myla Ta is a 34 y.o. female who presents to the emergency department \    34-year-old female presents to the emergency department with a ECO.  Patient is in police custody.  Patient was walking in the streets naked.  I reviewed old charts shows that she has a history of psychosis.  Patient cannot participate in history at this time she is very poor historian making nonsensical comments.    The history is provided by the police. No  was used.       Nursing Notes were reviewed.    REVIEW OF SYSTEMS       Review of Systems   Unable to perform ROS: Psychiatric disorder       Except as noted above the remainder of the review of systems was reviewed and negative.       PAST MEDICAL HISTORY     Past Medical History:   Diagnosis Date    Heart murmur     Recurrent UTI          SURGICAL HISTORY       Past Surgical History:   Procedure Laterality Date    WISDOM TOOTH EXTRACTION           CURRENT MEDICATIONS       Discharge Medication List as of 11/1/2024  1:54 PM        CONTINUE these medications which have NOT CHANGED    Details   ibuprofen (ADVIL;MOTRIN) 600 MG tablet Take 600 mg by mouth every 6 hours as neededHistorical Med             ALLERGIES     Patient has no known allergies.    FAMILY HISTORY     No family history on file.       SOCIAL HISTORY       Social History     Socioeconomic History    Marital status: Single   Tobacco Use    Smoking status: Every Day     Current packs/day: 1.00     Types: Cigarettes    Smokeless tobacco: Never   Substance and Sexual Activity    Alcohol use: Yes    Drug use: Not Currently     Types: Marijuana (Weed)       SCREENINGS         [place] [facility]

## 2024-10-31 NOTE — BSMART NOTE
Chief Complaint   Patient presents with    Mental Health Problem       Patient was evaluated by Tele-Psych who recommends inpatient psychiatric treatment for  telepsych unable to evaluated as patient was medicated and is now sedated. Patient to ER on an ECO and per [facility]B will be a TDO.    Patient is not voluntary inpatient treatment.    Past Medical History:   Diagnosis Date    Heart murmur     Recurrent UTI        Prior to Visit Medications    Medication Sig Taking? Authorizing Provider   ibuprofen (ADVIL;MOTRIN) 600 MG tablet Take 600 mg by mouth every 6 hours as needed  Automatic Reconciliation, Ar         The following labs and vitals were reviewed with on-call psychiatrist.    CMP, CBC, ETOH, HCG, and UDS    Vitals:    10/31/24 0545   BP: 111/76   Pulse: 78   Resp: 18   SpO2: 98%         Writer met with patient to assess the following    Ambulation -  patient is able to ambulate without difficulty.    ADLs - Patient able to perform own ADLs without assistance.    DME - Patient requires none.    In consultation with on call provider Linus CARO. Patient has been declined due to aggressive/violent behavior. CSB to find placement..

## 2024-10-31 NOTE — ED NOTES
Pt verbally aggressive towards staff. Pt also jumping off stretcher while in forensic restraint to R wrist. Pt has hx of aggressive behavior.     Pt medicated

## 2024-10-31 NOTE — ED TRIAGE NOTES
Pt brought in by EMS in PD custody for a mental health evaluation. Pt is ECO with PD. When asking pt questions, she just yells inappropriate responses.

## 2024-10-31 NOTE — VIRTUAL HEALTH
Myla Ta  [id number]  1990     Reason for Cancel: TelePsych Reason for Cancel: Patient unable to participate    IGOR spoke with NOHEMI Blas. Patient had to be medicated due to aggressive behavior. TelePsych consult will be cancelled and reordered at a later time.    Electronically signed by Yuliet Germain LCSW on 10/31/2024 at 6:14 AM.    Myla Ta, was evaluated through a synchronous (real-time) audio-video encounter. The patient (and/or guardian if applicable) is aware that this is a billable service, which includes applicable co-pays. This virtual visit was conducted with patient's (and/or legal guardian's) consent. Patient identification was verified, and a caregiver was present when appropriate.  The patient was located at Facility (Appt Department): [facility] EMERGENCY DEPT  [address] [address]  Loc: [phone]  The provider was located at Facility (Login Dept): [facility] TELEPSYCHIATRY PROGRAM  [address]  C/O VIRTUAL HEALTH TELEPSYCH  [facility] [address]  [phone]  Confirm you are appropriately licensed, registered, or certified to deliver care in the state where the patient is located as indicated above. If you are not or unsure, please re-schedule the visit: Yes, I confirm.   [place] Consult to Tele-Psych  Consult performed by: Yuliet Germain LCSW  Consult ordered by: Phan Watson MD  Reason for consult: Acute psychosis         Total time spent on this encounter: Not billed by time    --Yuliet Germain LCSW on 10/31/2024 at 6:08 AM    An electronic signature was used to authenticate this note.

## 2024-10-31 NOTE — ED NOTES
Pt straight stuck for blood. Samples walked to lab. Pt unable to urinate at this time. She did attempt to use bedpan but could not go. Refused straight cath.

## 2024-10-31 NOTE — VIRTUAL HEALTH
Reason for Cancel: TelePsych Reason for Cancel: Patient refused, Patient impaired     Writer attempted to evaluate pt however she was extremely agitated, screaming profanities at writer and staff, attempting to get out of bed. Law enforcement and staff at bedside attempting to redirect. Pt requiring medications for agitation. Order to be canceled. Please reconsult Telepsych when pt is calm and able to engage.     Myla Ta, was evaluated through a synchronous (real-time) audio-video encounter. The patient (and/or guardian if applicable) is aware that this is a billable service, which includes applicable co-pays. This virtual visit was conducted with patient's (and/or legal guardian's) consent. Patient identification was verified, and a caregiver was present when appropriate.  The patient was located at Facility (Appt Department): [facility] EMERGENCY DEPT  [address] [address]  Loc: [phone]  The provider was located at Home (City/State): [place], [place]  Confirm you are appropriately licensed, registered, or certified to deliver care in the state where the patient is located as indicated above. If you are not or unsure, please re-schedule the visit: Yes, I confirm.   Consults     Total time spent on this encounter: Not billed by time    --EVANGELINA Kulkarni on 10/31/2024 at 8:06 AM    An electronic signature was used to authenticate this note.

## 2024-11-01 VITALS
WEIGHT: 135 LBS | RESPIRATION RATE: 18 BRPM | DIASTOLIC BLOOD PRESSURE: 68 MMHG | HEIGHT: 64 IN | TEMPERATURE: 98.5 F | BODY MASS INDEX: 23.05 KG/M2 | OXYGEN SATURATION: 98 % | SYSTOLIC BLOOD PRESSURE: 108 MMHG | HEART RATE: 86 BPM

## 2024-11-01 PROCEDURE — 6360000002 HC RX W HCPCS: Performed by: STUDENT IN AN ORGANIZED HEALTH CARE EDUCATION/TRAINING PROGRAM

## 2024-11-01 PROCEDURE — 94761 N-INVAS EAR/PLS OXIMETRY MLT: CPT

## 2024-11-01 PROCEDURE — 96372 THER/PROPH/DIAG INJ SC/IM: CPT

## 2024-11-01 PROCEDURE — 2580000003 HC RX 258: Performed by: STUDENT IN AN ORGANIZED HEALTH CARE EDUCATION/TRAINING PROGRAM

## 2024-11-01 RX ORDER — DROPERIDOL 2.5 MG/ML
2.5 INJECTION, SOLUTION INTRAMUSCULAR; INTRAVENOUS ONCE
Status: COMPLETED | OUTPATIENT
Start: 2024-11-01 | End: 2024-11-01

## 2024-11-01 RX ORDER — LORAZEPAM 2 MG/ML
2 INJECTION INTRAMUSCULAR ONCE
Status: COMPLETED | OUTPATIENT
Start: 2024-11-01 | End: 2024-11-01

## 2024-11-01 RX ORDER — ZIPRASIDONE HYDROCHLORIDE 20 MG/1
20 CAPSULE ORAL ONCE
Status: DISCONTINUED | OUTPATIENT
Start: 2024-11-01 | End: 2024-11-01

## 2024-11-01 RX ADMIN — LORAZEPAM 2 MG: 2 INJECTION INTRAMUSCULAR; INTRAVENOUS at 13:22

## 2024-11-01 RX ADMIN — DROPERIDOL 2.5 MG: 2.5 INJECTION, SOLUTION INTRAMUSCULAR; INTRAVENOUS at 05:57

## 2024-11-01 RX ADMIN — ZIPRASIDONE MESYLATE 20 MG: 20 INJECTION, POWDER, LYOPHILIZED, FOR SOLUTION INTRAMUSCULAR at 03:22

## 2024-11-01 RX ADMIN — LORAZEPAM 2 MG: 2 INJECTION INTRAMUSCULAR; INTRAVENOUS at 05:55

## 2024-11-01 ASSESSMENT — PAIN - FUNCTIONAL ASSESSMENT: PAIN_FUNCTIONAL_ASSESSMENT: NONE - DENIES PAIN

## 2024-11-01 NOTE — ED NOTES
Pt on bedside commode, provided with warm wipes   New linen applied to the bed   RN gave pt clean/new scrub pants, since she removed the others, pt threw the scrub pants at RN.

## 2024-11-01 NOTE — ED PROVIDER NOTES
11:00PM EDT: Pt care assumed from off-going physician in stable condition. Pt complaint(s), current treatment plan, progression and available diagnostic results have been discussed thoroughly. The patient was seen and evaluated on my shift.     Pending workup(please list): CSB placement  Intended Disposition: Involuntary inpatient psychiatric admission    In brief: 34-year-old female brought to the ED now under TDO for psychosis    ED Course as of 11/01/24 0629   Thu Oct 31, 2024   0738 Mi to ks  [KS]   Fri Nov 01, 2024   0003 Spoke with CSB, patient is under TDO, on state [facility] with bed search currently ongoing. [CM]      ED Course User Index  [CM] Lenny Sandoval III, MD  [KS] Jorge Bravo MD        Transfer NOTE:  6:30 AM     Patient care transferred to Dr. Vizcarra in stable condition with disposition pending placement.      Discussed available diagnostic results and care plan at length.       Lenny Sandoval III, MD  11/01/24 0632       Lenny Sandoval III, MD  11/01/24 0642

## 2024-11-26 ENCOUNTER — HOSPITAL ENCOUNTER (EMERGENCY)
Facility: HOSPITAL | Age: 34
Discharge: ANOTHER HOSPITAL | End: 2024-11-26
Attending: EMERGENCY MEDICINE
Payer: MEDICARE

## 2024-11-26 VITALS
OXYGEN SATURATION: 97 % | DIASTOLIC BLOOD PRESSURE: 66 MMHG | HEIGHT: 64 IN | SYSTOLIC BLOOD PRESSURE: 111 MMHG | BODY MASS INDEX: 3.41 KG/M2 | WEIGHT: 20 LBS | RESPIRATION RATE: 18 BRPM | TEMPERATURE: 99.3 F | HEART RATE: 93 BPM

## 2024-11-26 DIAGNOSIS — F23 ACUTE PSYCHOSIS (HCC): Primary | ICD-10-CM

## 2024-11-26 LAB
ALBUMIN SERPL-MCNC: 3.5 G/DL (ref 3.4–5)
ALBUMIN/GLOB SERPL: 0.9 (ref 0.8–1.7)
ALP SERPL-CCNC: 75 U/L (ref 45–117)
ALT SERPL-CCNC: 29 U/L (ref 13–56)
AMPHET UR QL SCN: NEGATIVE
ANION GAP SERPL CALC-SCNC: 9 MMOL/L (ref 3–18)
APPEARANCE UR: CLEAR
AST SERPL-CCNC: 35 U/L (ref 10–38)
BACTERIA URNS QL MICRO: ABNORMAL /HPF
BARBITURATES UR QL SCN: NEGATIVE
BENZODIAZ UR QL: POSITIVE
BILIRUB SERPL-MCNC: 0.2 MG/DL (ref 0.2–1)
BILIRUB UR QL: NEGATIVE
BUN SERPL-MCNC: 6 MG/DL (ref 7–18)
BUN/CREAT SERPL: 9 (ref 12–20)
CALCIUM SERPL-MCNC: 8.9 MG/DL (ref 8.5–10.1)
CANNABINOIDS UR QL SCN: POSITIVE
CHLORIDE SERPL-SCNC: 109 MMOL/L (ref 100–111)
CO2 SERPL-SCNC: 21 MMOL/L (ref 21–32)
COCAINE UR QL SCN: POSITIVE
COLOR UR: YELLOW
CREAT SERPL-MCNC: 0.65 MG/DL (ref 0.6–1.3)
EPITH CASTS URNS QL MICRO: ABNORMAL /LPF (ref 0–5)
ERYTHROCYTE [DISTWIDTH] IN BLOOD BY AUTOMATED COUNT: 15.9 % (ref 11.6–14.5)
ETHANOL SERPL-MCNC: <3 MG/DL (ref 0–3)
GLOBULIN SER CALC-MCNC: 4.1 G/DL (ref 2–4)
GLUCOSE SERPL-MCNC: 107 MG/DL (ref 74–99)
GLUCOSE UR STRIP.AUTO-MCNC: NEGATIVE MG/DL
HCT VFR BLD AUTO: 36.9 % (ref 35–45)
HGB BLD-MCNC: 11.9 G/DL (ref 12–16)
HGB UR QL STRIP: ABNORMAL
KETONES UR QL STRIP.AUTO: NEGATIVE MG/DL
LEUKOCYTE ESTERASE UR QL STRIP.AUTO: ABNORMAL
Lab: ABNORMAL
MCH RBC QN AUTO: 26.9 PG (ref 24–34)
MCHC RBC AUTO-ENTMCNC: 32.2 G/DL (ref 31–37)
MCV RBC AUTO: 83.5 FL (ref 78–100)
METHADONE UR QL: NEGATIVE
MUCOUS THREADS URNS QL MICRO: ABNORMAL /LPF
NITRITE UR QL STRIP.AUTO: NEGATIVE
NRBC # BLD: 0 K/UL (ref 0–0.01)
NRBC BLD-RTO: 0 PER 100 WBC
OPIATES UR QL: NEGATIVE
PCP UR QL: NEGATIVE
PH UR STRIP: 5.5 (ref 5–8)
PLATELET # BLD AUTO: 401 K/UL (ref 135–420)
PMV BLD AUTO: 9.3 FL (ref 9.2–11.8)
POTASSIUM SERPL-SCNC: 3.4 MMOL/L (ref 3.5–5.5)
PROT SERPL-MCNC: 7.6 G/DL (ref 6.4–8.2)
PROT UR STRIP-MCNC: NEGATIVE MG/DL
RBC # BLD AUTO: 4.42 M/UL (ref 4.2–5.3)
RBC #/AREA URNS HPF: NEGATIVE /HPF (ref 0–5)
SODIUM SERPL-SCNC: 139 MMOL/L (ref 136–145)
SP GR UR REFRACTOMETRY: 1.01 (ref 1–1.03)
UROBILINOGEN UR QL STRIP.AUTO: 0.2 EU/DL (ref 0.2–1)
WBC # BLD AUTO: 12.3 K/UL (ref 4.6–13.2)
WBC URNS QL MICRO: ABNORMAL /HPF (ref 0–5)

## 2024-11-26 PROCEDURE — 99285 EMERGENCY DEPT VISIT HI MDM: CPT

## 2024-11-26 PROCEDURE — 85027 COMPLETE CBC AUTOMATED: CPT

## 2024-11-26 PROCEDURE — 90791 PSYCH DIAGNOSTIC EVALUATION: CPT

## 2024-11-26 PROCEDURE — 80053 COMPREHEN METABOLIC PANEL: CPT

## 2024-11-26 PROCEDURE — 94761 N-INVAS EAR/PLS OXIMETRY MLT: CPT

## 2024-11-26 PROCEDURE — 81001 URINALYSIS AUTO W/SCOPE: CPT

## 2024-11-26 PROCEDURE — 82077 ASSAY SPEC XCP UR&BREATH IA: CPT

## 2024-11-26 PROCEDURE — 80307 DRUG TEST PRSMV CHEM ANLYZR: CPT

## 2024-11-26 ASSESSMENT — LIFESTYLE VARIABLES
HOW MANY STANDARD DRINKS CONTAINING ALCOHOL DO YOU HAVE ON A TYPICAL DAY: 1 OR 2
HOW OFTEN DO YOU HAVE A DRINK CONTAINING ALCOHOL: NEVER

## 2024-11-26 ASSESSMENT — PAIN - FUNCTIONAL ASSESSMENT: PAIN_FUNCTIONAL_ASSESSMENT: 0-10

## 2024-11-26 ASSESSMENT — PAIN SCALES - GENERAL: PAINLEVEL_OUTOF10: 0

## 2024-11-26 NOTE — ED NOTES
Pt has been accepted at [facility] to U by Dr. Ezekiel Rivera. Number to call report [phone]

## 2024-11-26 NOTE — ED TRIAGE NOTES
Patient comes in under a paperless ECO. Patient was trying to strip in front of the medics and was not aware where she was.

## 2024-11-26 NOTE — ED PROVIDER NOTES
6:00 AM :Pt care assumed from Dr. Dugan , ED provider. Pt complaint(s), current treatment plan, progression and available diagnostic results have been discussed thoroughly. The patient was seen and evaluated on my shift.   Rounding occurred: No  Intended Disposition: admit   Pending diagnostic reports and/or labs (please list): yes  Awaiting for crisis and labs      Phan Watson MD  11/26/24 0601    
Platelets 401 135 - 420 K/uL    MPV 9.3 9.2 - 11.8 FL    Nucleated RBCs 0.0 0  WBC    nRBC 0.00 0.00 - 0.01 K/uL   Comprehensive Metabolic Panel    Collection Time: 11/26/24  5:05 AM   Result Value Ref Range    Sodium 139 136 - 145 mmol/L    Potassium 3.4 (L) 3.5 - 5.5 mmol/L    Chloride 109 100 - 111 mmol/L    CO2 21 21 - 32 mmol/L    Anion Gap 9 3.0 - 18 mmol/L    Glucose 107 (H) 74 - 99 mg/dL    BUN 6 (L) 7.0 - 18 MG/DL    Creatinine 0.65 0.6 - 1.3 MG/DL    BUN/Creatinine Ratio 9 (L) 12 - 20      Est, Glom Filt Rate >90 >60 ml/min/1.73m2    Calcium 8.9 8.5 - 10.1 MG/DL    Total Bilirubin 0.2 0.2 - 1.0 MG/DL    ALT 29 13 - 56 U/L    AST 35 10 - 38 U/L    Alk Phosphatase 75 45 - 117 U/L    Total Protein 7.6 6.4 - 8.2 g/dL    Albumin 3.5 3.4 - 5.0 g/dL    Globulin 4.1 (H) 2.0 - 4.0 g/dL    Albumin/Globulin Ratio 0.9 0.8 - 1.7     Ethanol    Collection Time: 11/26/24  5:05 AM   Result Value Ref Range    Ethanol Lvl <3 0 - 3 MG/DL       Radiologic Studies -   No orders to display         Procedures and Critical Care     Performed by: JOANNE TAYLOR, DO    Procedures     JOANNE TAYLOR, DO    Medical Decision Making and ED Course   - I am the first and primary provider for this patient AND AM THE PRIMARY PROVIDER OF RECORD.    - I reviewed the vital signs, available nursing notes, past medical history, past surgical history, family history and social history.    - Initial assessment performed. The patients presenting problems have been discussed, and the staff are in agreement with the care plan formulated and outlined with them.  I have encouraged them to ask questions as they arise throughout their visit.    Vital Signs-Reviewed the patient's vital signs.    Patient Vitals for the past 12 hrs:   Temp Pulse Resp BP SpO2   11/26/24 1233 -- 93 18 111/66 97 %   11/26/24 1144 99.3 °F (37.4 °C) 84 18 (!) 99/59 99 %   11/26/24 0730 97.9 °F (36.6 °C) 87 16 104/61 98 %         Provider Notes (Medical Decision Making):

## 2024-11-26 NOTE — VIRTUAL HEALTH
[id number] [phone]  Confirm you are appropriately licensed, registered, or certified to deliver care in the state where the patient is located as indicated above. If you are not or unsure, please re-schedule the visit: Yes, I confirm.   [place] Consult to Tele-Psych  Consult performed by: Yuliet Germain LCSW  Consult ordered by: Immanuel Dugan DO  Reason for consult: Psychosis         Total time spent on this encounter:  50 minutes    --Yuliet Germain LCSW on 11/26/2024 at 5:07 AM    An electronic signature was used to authenticate this note.

## 2024-11-26 NOTE — ED NOTES
Pt dressed out, checked by security and this tech. Pt has 2 belonging bags on the middle shelf of locker 2. Urine and blood work collected and sent to lab. Pt currently speaking with tele-psych.

## 2025-06-29 ENCOUNTER — HOSPITAL ENCOUNTER (EMERGENCY)
Facility: HOSPITAL | Age: 35
Discharge: LEFT BEFORE DISCHARGE | End: 2025-06-30
Attending: STUDENT IN AN ORGANIZED HEALTH CARE EDUCATION/TRAINING PROGRAM
Payer: MEDICARE

## 2025-06-29 VITALS
HEART RATE: 95 BPM | BODY MASS INDEX: 27.66 KG/M2 | RESPIRATION RATE: 20 BRPM | DIASTOLIC BLOOD PRESSURE: 69 MMHG | HEIGHT: 64 IN | SYSTOLIC BLOOD PRESSURE: 92 MMHG | WEIGHT: 162 LBS | TEMPERATURE: 98.2 F | OXYGEN SATURATION: 98 %

## 2025-06-29 DIAGNOSIS — F23 ACUTE PSYCHOSIS (HCC): Primary | ICD-10-CM

## 2025-06-29 DIAGNOSIS — R45.1 AGITATION: ICD-10-CM

## 2025-06-29 LAB
ANION GAP SERPL CALC-SCNC: 14 MMOL/L (ref 3–18)
BASOPHILS # BLD: 0.1 K/UL (ref 0–0.1)
BASOPHILS NFR BLD: 0.7 % (ref 0–2)
BUN SERPL-MCNC: 10 MG/DL (ref 6–23)
BUN/CREAT SERPL: 13 (ref 12–20)
CALCIUM SERPL-MCNC: 9.1 MG/DL (ref 8.5–10.1)
CHLORIDE SERPL-SCNC: 105 MMOL/L (ref 98–107)
CO2 SERPL-SCNC: 20 MMOL/L (ref 21–32)
CREAT SERPL-MCNC: 0.78 MG/DL (ref 0.6–1.3)
DIFFERENTIAL METHOD BLD: ABNORMAL
EOSINOPHIL # BLD: 0.18 K/UL (ref 0–0.4)
EOSINOPHIL NFR BLD: 1.3 % (ref 0–5)
ERYTHROCYTE [DISTWIDTH] IN BLOOD BY AUTOMATED COUNT: 13.6 % (ref 11.6–14.5)
ETHANOL SERPL-MCNC: <11 MG/DL (ref 0–0.08)
GLUCOSE SERPL-MCNC: 104 MG/DL (ref 74–108)
HCG SERPL QL: NEGATIVE
HCT VFR BLD AUTO: 37.2 % (ref 35–45)
HGB BLD-MCNC: 12.2 G/DL (ref 12–16)
IMM GRANULOCYTES # BLD AUTO: 0.16 K/UL (ref 0–0.04)
IMM GRANULOCYTES NFR BLD AUTO: 1.1 % (ref 0–0.5)
LYMPHOCYTES # BLD: 2.54 K/UL (ref 0.9–3.6)
LYMPHOCYTES NFR BLD: 18 % (ref 21–52)
MCH RBC QN AUTO: 27.4 PG (ref 24–34)
MCHC RBC AUTO-ENTMCNC: 32.8 G/DL (ref 31–37)
MCV RBC AUTO: 83.6 FL (ref 78–100)
MONOCYTES # BLD: 1.82 K/UL (ref 0.05–1.2)
MONOCYTES NFR BLD: 12.9 % (ref 3–10)
NEUTS SEG # BLD: 9.3 K/UL (ref 1.8–8)
NEUTS SEG NFR BLD: 66 % (ref 40–73)
NRBC # BLD: 0 K/UL (ref 0–0.01)
NRBC BLD-RTO: 0 PER 100 WBC
PLATELET # BLD AUTO: 480 K/UL (ref 135–420)
PLATELET COMMENT: ABNORMAL
PMV BLD AUTO: 9.4 FL (ref 9.2–11.8)
POTASSIUM SERPL-SCNC: 3.8 MMOL/L (ref 3.5–5.5)
RBC # BLD AUTO: 4.45 M/UL (ref 4.2–5.3)
RBC MORPH BLD: ABNORMAL
SODIUM SERPL-SCNC: 138 MMOL/L (ref 136–145)
WBC # BLD AUTO: 14.1 K/UL (ref 4.6–13.2)

## 2025-06-29 PROCEDURE — 80048 BASIC METABOLIC PNL TOTAL CA: CPT

## 2025-06-29 PROCEDURE — 85025 COMPLETE CBC W/AUTO DIFF WBC: CPT

## 2025-06-29 PROCEDURE — 96372 THER/PROPH/DIAG INJ SC/IM: CPT

## 2025-06-29 PROCEDURE — 84703 CHORIONIC GONADOTROPIN ASSAY: CPT

## 2025-06-29 PROCEDURE — 82077 ASSAY SPEC XCP UR&BREATH IA: CPT

## 2025-06-29 PROCEDURE — 99284 EMERGENCY DEPT VISIT MOD MDM: CPT

## 2025-06-29 PROCEDURE — 6360000002 HC RX W HCPCS: Performed by: STUDENT IN AN ORGANIZED HEALTH CARE EDUCATION/TRAINING PROGRAM

## 2025-06-29 RX ORDER — LORAZEPAM 2 MG/ML
2 INJECTION INTRAMUSCULAR ONCE
Status: COMPLETED | OUTPATIENT
Start: 2025-06-29 | End: 2025-06-29

## 2025-06-29 RX ORDER — DIPHENHYDRAMINE HYDROCHLORIDE 50 MG/ML
50 INJECTION, SOLUTION INTRAMUSCULAR; INTRAVENOUS
Status: COMPLETED | OUTPATIENT
Start: 2025-06-29 | End: 2025-06-29

## 2025-06-29 RX ORDER — OLANZAPINE 5 MG/1
10 TABLET, ORALLY DISINTEGRATING ORAL ONCE
Status: DISCONTINUED | OUTPATIENT
Start: 2025-06-29 | End: 2025-06-29

## 2025-06-29 RX ORDER — HALOPERIDOL 5 MG/ML
5 INJECTION INTRAMUSCULAR
Status: COMPLETED | OUTPATIENT
Start: 2025-06-29 | End: 2025-06-29

## 2025-06-29 RX ADMIN — DIPHENHYDRAMINE HYDROCHLORIDE 50 MG: 50 INJECTION INTRAMUSCULAR; INTRAVENOUS at 21:11

## 2025-06-29 RX ADMIN — LORAZEPAM 2 MG: 2 INJECTION INTRAMUSCULAR; INTRAVENOUS at 20:37

## 2025-06-29 RX ADMIN — HALOPERIDOL LACTATE 5 MG: 5 INJECTION, SOLUTION INTRAMUSCULAR at 21:11

## 2025-06-29 ASSESSMENT — PAIN - FUNCTIONAL ASSESSMENT: PAIN_FUNCTIONAL_ASSESSMENT: NONE - DENIES PAIN

## 2025-06-29 NOTE — ED TRIAGE NOTES
Pt to ED by PD, pt behavior is erratic in triage, having a difficult time articulating what medical or psychiatric complaints she has, pt has mascara all over her face. Pt denies any drug use, or SI or HI at this time.

## 2025-06-30 LAB
EKG ATRIAL RATE: 77 BPM
EKG DIAGNOSIS: NORMAL
EKG P AXIS: 59 DEGREES
EKG P-R INTERVAL: 176 MS
EKG Q-T INTERVAL: 416 MS
EKG QRS DURATION: 96 MS
EKG QTC CALCULATION (BAZETT): 470 MS
EKG R AXIS: 68 DEGREES
EKG T AXIS: 56 DEGREES
EKG VENTRICULAR RATE: 77 BPM

## 2025-06-30 PROCEDURE — 90791 PSYCH DIAGNOSTIC EVALUATION: CPT

## 2025-06-30 PROCEDURE — 93005 ELECTROCARDIOGRAM TRACING: CPT | Performed by: STUDENT IN AN ORGANIZED HEALTH CARE EDUCATION/TRAINING PROGRAM

## 2025-06-30 PROCEDURE — 93010 ELECTROCARDIOGRAM REPORT: CPT | Performed by: INTERNAL MEDICINE

## 2025-06-30 NOTE — ED PROVIDER NOTES
ED Course as of 06/30/25 0955   Sun Jun 29, 2025 2055 Patient with increasing agitation despite receiving IM Ativan.  Attempted de-escalation however continues to fail.  I am concerned for her own safety as well as further patient's in the emergency department.  Will provide patient with Haldol and Benadryl at this time. [DV]   2214 Lab at this time significant for white count 14.1, bicarb 20, otherwise labs grossly within normal limits.  Suspect patient's leukocytosis likely secondary to substance use.  Will consult telepsychiatry at this time. [DV]   Mon Jun 30, 2025   0035 Spoke with telepsych about possible need for escalation and inpatient admission. States that due to patient being asleep cannot formally evaluate patient at this time and to reconsult when more awake. Will continue to monitor patinet.  [DV]   0643 33 y/o f , acutely psychotic     - pending teleosych whenever she wakes up  - as soon as telepsych approves, pt will need tdo  Contact crisis immediately to have pt eval for csb [NF]   0809 EKG interpreted by me.  Ventricular rate 77 no STEMI no ST depressions T wave inversion in lead V2 all intervals grossly within normal limits. [NF]   0953 Patient was seen by telepsych and they recommended TDO.  While I was in the procedure patient eloped from the ER right before the TDO was set into place.  [NF]      ED Course User Index  [DV] Johnny Vizcarra Jr., DO  [NF] Chinedu Thomas MD Frith, Nikea, MD  06/30/25 0921

## 2025-06-30 NOTE — ED NOTES
Patient brought back to room. Patient familiar to this RN and patient exhibiting psychotic behavior. Patient states that she hasn't seen her daughter in 16 years but she found out that she has a motorcycle and then yells \"what the fuck Pete?\" Patient yelling but not making sense. Patient redirectable but continuously trying to walk out of room. Patient given paper scrubs to change in to.

## 2025-06-30 NOTE — ED NOTES
Telepsych attempt to speak with pt. Multiple times pt gets up and walks away from camera around unit. Security arrives and attempt to get pt back in anca to speak with telepsych. Pt extremely paranoid and states telling this nurse that I am \" A stupid fucking bitch.\" \" Your a stupid cunt, keeping standing there you stupid bitch.\" Pt reports she is ready to leave. Pt advised telepsych is petition a TDO and still demands her items to leave. Security gave pt back her 3 bags of belongings. Pt closed door of room o her self. Pt hitting computer and telepsych asked if we may remove computer from room before pt breaks. Pt noted hitting the wall and calling the telepsych a \"bitch\"

## 2025-06-30 NOTE — VIRTUAL HEALTH
Received request for Telepsychiatry evaluation.  11:59PMDelay Information, Chart reviewed, Case discussed with staff, and Patient recently medicated and is currently resting unable to participate. Our team will continue to monitor and attempt again at a later time. If patient able to participate please perfectserve our team at \" \"Telepsych \"    3:57 AMPatient continues to rest at this time not able to participate. Will continue to follow and attempt at a later time. If patient able to participate please perfectserve our team.    The patient was located at Facility (Appt Department): [facility] EMERGENCY DEPARTMENT  [address] [address]  Loc: [phone]  The provider was located at Home (City/State): [place]  Confirm you are appropriately licensed, registered, or certified to deliver care in the state where the patient is located as indicated above. If you are not or unsure, please re-schedule the visit: Yes, I confirm.   Consults     Total time spent on this encounter: Not billed by time    --ARPITA Chung CNP on 6/30/2025 at 12:00 AM    An electronic signature was used to authenticate this note.

## 2025-06-30 NOTE — ED PROVIDER NOTES
EMERGENCY DEPARTMENT HISTORY AND PHYSICAL EXAM      Date: 6/29/2025  Patient Name: Myla Ta    History of Presenting Illness     Chief Complaint   Patient presents with    Mental Health Problem       History (Context): Myla Ta is a 34 y.o. female  presents to the ED today with chief complaint of mental health complaint.  Patient dropped off to the emergency department by police.  Upon my evaluation she is distracted, responding to internal stimuli, and hyperactive.  She denies any SI or HI.  Routinely has labile affect and screams during my evaluation.  History and review of systems limited secondary to psychiatric disorder.      PCP: Debbie Puente MD    No current facility-administered medications for this encounter.     Current Outpatient Medications   Medication Sig Dispense Refill    ibuprofen (ADVIL;MOTRIN) 600 MG tablet Take 600 mg by mouth every 6 hours as needed         Past History     Past Medical History:   Past Medical History:   Diagnosis Date    Heart murmur     Recurrent UTI        Past Surgical History:  Past Surgical History:   Procedure Laterality Date    WISDOM TOOTH EXTRACTION         Family History:  No family history on file.    Social History:   Social History     Tobacco Use    Smoking status: Every Day     Current packs/day: 1.00     Types: Cigarettes    Smokeless tobacco: Never   Substance Use Topics    Alcohol use: Yes    Drug use: Not Currently     Types: Marijuana (Weed)       Allergies:  No Known Allergies      Physical Exam     Vitals:    06/29/25 2001   BP: 92/69   Pulse: 95   Resp: 20   Temp: 98.2 °F (36.8 °C)   TempSrc: Oral   SpO2: 98%   Weight: 73.5 kg (162 lb)   Height: 1.626 m (5' 4\")       Physical Exam  Constitutional:       General: She is not in acute distress.     Appearance: She is not ill-appearing or toxic-appearing.      Comments: Disheveled   HENT:      Head: Normocephalic and atraumatic.   Eyes:      General: No scleral icterus.  Cardiovascular:

## 2025-06-30 NOTE — ED NOTES
Patient's belongings inventoried and 3 bags placed on the top shelf of locker #8.    Inventory sheet placed in bin to be scanned in to patient's chart and copy attached to patient's labels hanging on outside of door.

## 2025-06-30 NOTE — VIRTUAL HEALTH
Myla Ta  [id number]  1990     Social Work Behavioral Health Crisis Assessment    06/30/25    Chief Complaint: Psychiatric Evaluation     HPI: Patient is a 34 y.o. White (non-) female who presents on 6/29/25 via PD for a psychiatric evaluation. Records show pt presented with erratic, disorganized behavior, responding to internal stimuli, difficult to engage. Records show hx of Psychosis, Bipolar 1, Schizoaffective Disorder, ADHD, Polysubstance Abuse.    Upon assessment pt presents with rapid, pressured speech, screaming nonsensically at writer, and has mascara all over her face. When asked about reason for presentation she states \"the  brought me\" but is unable to provide why and repeatedly screams \"I don't know!\". She frequently screams \"I'm not having a psychiatric emergency! I don't need to talk to psychiatry\". Several times throughout the assessment she hits the camera or pushes the Teledoc cart away. She frequently leaves the room and requires redirection from staff. She appears paranoid and suspicious of writer. She is unable to share why she is in the hospital and is not oriented to time or situation. She is observed pacing the room and responding to internal stimuli. History is limited secondary to psychiatric symptoms.     Collateral: Unable to obtain collateral    Past Psychiatric History:  Previous Diagnoses/symptoms: per chart, Psychosis, Bipolar 1, Schizoaffective Disorder, ADHD, Polysubstance Abuse  Previous suicide attempts/self-harm: per chart, yes  Inpatient psychiatric hospitalizations: yes  Current outpatient psychiatric provider: unable to assess  Current therapist: unable to assess  Previous psychiatric medication trials: unable to assess  Current psychiatric medications: unable to assess  Family Psychiatric History: unable to assess    Substance Abuse History: Records show hx of polysubstance use   Tobacco: unable to assess  Alcohol: unable to assess  Marijuana: unable to

## 2025-06-30 NOTE — ED NOTES
EKG completed, pt still needs urine sample but not agreeable to getting out of bed. Stated, \"I just need to rest.\"